# Patient Record
Sex: MALE | Race: OTHER | HISPANIC OR LATINO
[De-identification: names, ages, dates, MRNs, and addresses within clinical notes are randomized per-mention and may not be internally consistent; named-entity substitution may affect disease eponyms.]

---

## 2017-08-03 ENCOUNTER — TRANSCRIPTION ENCOUNTER (OUTPATIENT)
Age: 22
End: 2017-08-03

## 2017-08-10 ENCOUNTER — TRANSCRIPTION ENCOUNTER (OUTPATIENT)
Age: 22
End: 2017-08-10

## 2017-10-03 ENCOUNTER — TRANSCRIPTION ENCOUNTER (OUTPATIENT)
Age: 22
End: 2017-10-03

## 2018-06-06 ENCOUNTER — TRANSCRIPTION ENCOUNTER (OUTPATIENT)
Age: 23
End: 2018-06-06

## 2020-12-28 ENCOUNTER — EMERGENCY (EMERGENCY)
Facility: HOSPITAL | Age: 25
LOS: 0 days | Discharge: HOME | End: 2020-12-28
Attending: EMERGENCY MEDICINE | Admitting: EMERGENCY MEDICINE
Payer: MEDICAID

## 2020-12-28 VITALS
HEART RATE: 96 BPM | SYSTOLIC BLOOD PRESSURE: 152 MMHG | RESPIRATION RATE: 18 BRPM | WEIGHT: 279.99 LBS | HEIGHT: 64 IN | OXYGEN SATURATION: 98 % | DIASTOLIC BLOOD PRESSURE: 94 MMHG | TEMPERATURE: 98 F

## 2020-12-28 DIAGNOSIS — R07.89 OTHER CHEST PAIN: ICD-10-CM

## 2020-12-28 DIAGNOSIS — J45.909 UNSPECIFIED ASTHMA, UNCOMPLICATED: ICD-10-CM

## 2020-12-28 DIAGNOSIS — M94.0 CHONDROCOSTAL JUNCTION SYNDROME [TIETZE]: ICD-10-CM

## 2020-12-28 DIAGNOSIS — Z20.828 CONTACT WITH AND (SUSPECTED) EXPOSURE TO OTHER VIRAL COMMUNICABLE DISEASES: ICD-10-CM

## 2020-12-28 PROCEDURE — 71046 X-RAY EXAM CHEST 2 VIEWS: CPT | Mod: 26

## 2020-12-28 PROCEDURE — 99284 EMERGENCY DEPT VISIT MOD MDM: CPT

## 2020-12-28 NOTE — ED ADULT TRIAGE NOTE - CHIEF COMPLAINT QUOTE
pt states "I been having chest tightness and pain x2weeks, that moves. I also have been going through a lot and feel anxious"

## 2020-12-28 NOTE — ED PROVIDER NOTE - OBJECTIVE STATEMENT
25y male with PMH of asthma well controlled not on inhaler presents with 1 week hx of gradual onset chest tightness. 25y male with PMH of asthma well controlled not on inhaler presents with 1 week hx of gradual onset chest tightness. reports using albuterol inhaler with no resolution of symptoms.  Denies HA, Fever, Chills, Weakness, SOB, AC, cough, loss of taste or smell, abd pain, leg swelling.

## 2020-12-28 NOTE — ED PROVIDER NOTE - NS ED ROS FT
Constitutional:  No fevers or chills.  Eyes:  No visual changes, eye pain, or discharge.  ENT:  No hearing changes. No sore throat.  Neck:  No neck pain or stiffness.  Cardiac:  (+) chest tightness. No edema.  Resp:  No cough or SOB. No hemoptysis.   GI:  No nausea, vomiting, diarrhea, or abdominal pain.  :  No dysuria, frequency, or hematuria.  MSK:  No myalgias or joint pain/swelling.  Neuro:  No headache, dizziness, or weakness.  Skin:  No skin rash.

## 2020-12-28 NOTE — ED PROVIDER NOTE - PATIENT PORTAL LINK FT
You can access the FollowMyHealth Patient Portal offered by [facility] by registering at the following website: [email or web address]. By joining [employer]’s FollowMyHealth portal, you will also be able to view your health information using other applications (apps) compatible with our system.

## 2020-12-28 NOTE — ED PROVIDER NOTE - ATTENDING CONTRIBUTION TO CARE
c/o chest pain.  hx of asthma.  VS noted.  Chest clear.  Heart RR no murmur.  abd NT.  Ext FROM.  =pulses.  ekg, CXR normal.
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
## 2020-12-28 NOTE — ED PROVIDER NOTE - CLINICAL SUMMARY MEDICAL DECISION MAKING FREE TEXT BOX
low risk for ACS, PE.  dx testing reviewed. In my opinion, out patient treatment and follow up are appropriate.

## 2020-12-28 NOTE — ED PROVIDER NOTE - NSFOLLOWUPCLINICS_GEN_ALL_ED_FT
[facility]  Medicine  [address], [place]   Phone: [phone]  Fax:   Follow Up Time: Routine

## 2020-12-29 LAB — SARS-COV-2 RNA SPEC QL NAA+PROBE: SIGNIFICANT CHANGE UP

## 2023-01-16 NOTE — ED PROVIDER NOTE - NSFOLLOWUPINSTRUCTIONS_ED_ALL_ED_FT
Glasses Prescription given to patient. Costochondritis  Costochondritis is swelling and irritation (inflammation) of the tissue (cartilage) that connects your ribs to your breastbone (sternum). This causes pain in the front of your chest. Usually, the pain:  Starts gradually.  Is in more than one rib.  This condition usually goes away on its own over time.    Follow these instructions at home:  Do not do anything that makes your pain worse.  If directed, put ice on the painful area:  Put ice in a plastic bag.  Place a towel between your skin and the bag.  Leave the ice on for 20 minutes, 2–3 times a day.  If directed, put heat on the affected area as often as told by your doctor. Use the heat source that your doctor tells you to use, such as a moist heat pack or a heating pad.  Place a towel between your skin and the heat source.  Leave the heat on for 20–30 minutes.  Take off the heat if your skin turns bright red. This is very important if you cannot feel pain, heat, or cold. You may have a greater risk of getting burned.  Take over-the-counter and prescription medicines only as told by your doctor.  Return to your normal activities as told by your doctor. Ask your doctor what activities are safe for you.  Keep all follow-up visits as told by your doctor. This is important.  Contact a doctor if:  You have chills or a fever.  Your pain does not go away or it gets worse.  You have a cough that does not go away.  Get help right away if:  You are short of breath.  This information is not intended to replace advice given to you by your health care provider. Make sure you discuss any questions you have with your health care provider.

## 2024-04-18 ENCOUNTER — INPATIENT (INPATIENT)
Facility: HOSPITAL | Age: 29
LOS: 0 days | Discharge: HOME | DRG: 53 | End: 2024-04-19
Attending: HOSPITALIST | Admitting: STUDENT IN AN ORGANIZED HEALTH CARE EDUCATION/TRAINING PROGRAM
Payer: MEDICAID

## 2024-04-18 VITALS
OXYGEN SATURATION: 98 % | HEART RATE: 90 BPM | DIASTOLIC BLOOD PRESSURE: 81 MMHG | TEMPERATURE: 98 F | RESPIRATION RATE: 18 BRPM | SYSTOLIC BLOOD PRESSURE: 173 MMHG | HEIGHT: 64 IN | WEIGHT: 315 LBS

## 2024-04-18 DIAGNOSIS — R56.9 UNSPECIFIED CONVULSIONS: ICD-10-CM

## 2024-04-18 PROBLEM — J45.909 UNSPECIFIED ASTHMA, UNCOMPLICATED: Chronic | Status: ACTIVE | Noted: 2020-12-28

## 2024-04-18 LAB
ALBUMIN SERPL ELPH-MCNC: 4.4 G/DL — SIGNIFICANT CHANGE UP (ref 3.5–5.2)
ALP SERPL-CCNC: 101 U/L — SIGNIFICANT CHANGE UP (ref 30–115)
ALT FLD-CCNC: 29 U/L — SIGNIFICANT CHANGE UP (ref 0–41)
ANION GAP SERPL CALC-SCNC: 11 MMOL/L — SIGNIFICANT CHANGE UP (ref 7–14)
AST SERPL-CCNC: 19 U/L — SIGNIFICANT CHANGE UP (ref 0–41)
BASOPHILS # BLD AUTO: 0.04 K/UL — SIGNIFICANT CHANGE UP (ref 0–0.2)
BASOPHILS NFR BLD AUTO: 0.2 % — SIGNIFICANT CHANGE UP (ref 0–1)
BILIRUB SERPL-MCNC: 0.4 MG/DL — SIGNIFICANT CHANGE UP (ref 0.2–1.2)
BUN SERPL-MCNC: 13 MG/DL — SIGNIFICANT CHANGE UP (ref 10–20)
CALCIUM SERPL-MCNC: 9.5 MG/DL — SIGNIFICANT CHANGE UP (ref 8.4–10.5)
CHLORIDE SERPL-SCNC: 100 MMOL/L — SIGNIFICANT CHANGE UP (ref 98–110)
CK SERPL-CCNC: 184 U/L — SIGNIFICANT CHANGE UP (ref 0–225)
CO2 SERPL-SCNC: 26 MMOL/L — SIGNIFICANT CHANGE UP (ref 17–32)
CREAT SERPL-MCNC: 0.9 MG/DL — SIGNIFICANT CHANGE UP (ref 0.7–1.5)
EGFR: 119 ML/MIN/1.73M2 — SIGNIFICANT CHANGE UP
EOSINOPHIL # BLD AUTO: 0.04 K/UL — SIGNIFICANT CHANGE UP (ref 0–0.7)
EOSINOPHIL NFR BLD AUTO: 0.2 % — SIGNIFICANT CHANGE UP (ref 0–8)
GLUCOSE SERPL-MCNC: 99 MG/DL — SIGNIFICANT CHANGE UP (ref 70–99)
HCT VFR BLD CALC: 45.6 % — SIGNIFICANT CHANGE UP (ref 42–52)
HGB BLD-MCNC: 15.2 G/DL — SIGNIFICANT CHANGE UP (ref 14–18)
IMM GRANULOCYTES NFR BLD AUTO: 0.5 % — HIGH (ref 0.1–0.3)
LACTATE SERPL-SCNC: 1.3 MMOL/L — SIGNIFICANT CHANGE UP (ref 0.7–2)
LACTATE SERPL-SCNC: 1.6 MMOL/L — SIGNIFICANT CHANGE UP (ref 0.7–2)
LYMPHOCYTES # BLD AUTO: 1.29 K/UL — SIGNIFICANT CHANGE UP (ref 1.2–3.4)
LYMPHOCYTES # BLD AUTO: 7.2 % — LOW (ref 20.5–51.1)
MCHC RBC-ENTMCNC: 28.7 PG — SIGNIFICANT CHANGE UP (ref 27–31)
MCHC RBC-ENTMCNC: 33.3 G/DL — SIGNIFICANT CHANGE UP (ref 32–37)
MCV RBC AUTO: 86 FL — SIGNIFICANT CHANGE UP (ref 80–94)
MONOCYTES # BLD AUTO: 0.61 K/UL — HIGH (ref 0.1–0.6)
MONOCYTES NFR BLD AUTO: 3.4 % — SIGNIFICANT CHANGE UP (ref 1.7–9.3)
NEUTROPHILS # BLD AUTO: 15.89 K/UL — HIGH (ref 1.4–6.5)
NEUTROPHILS NFR BLD AUTO: 88.5 % — HIGH (ref 42.2–75.2)
NRBC # BLD: 0 /100 WBCS — SIGNIFICANT CHANGE UP (ref 0–0)
PLATELET # BLD AUTO: 354 K/UL — SIGNIFICANT CHANGE UP (ref 130–400)
PMV BLD: 9.8 FL — SIGNIFICANT CHANGE UP (ref 7.4–10.4)
POTASSIUM SERPL-MCNC: 4.6 MMOL/L — SIGNIFICANT CHANGE UP (ref 3.5–5)
POTASSIUM SERPL-SCNC: 4.6 MMOL/L — SIGNIFICANT CHANGE UP (ref 3.5–5)
PROT SERPL-MCNC: 7.2 G/DL — SIGNIFICANT CHANGE UP (ref 6–8)
RBC # BLD: 5.3 M/UL — SIGNIFICANT CHANGE UP (ref 4.7–6.1)
RBC # FLD: 13 % — SIGNIFICANT CHANGE UP (ref 11.5–14.5)
SODIUM SERPL-SCNC: 137 MMOL/L — SIGNIFICANT CHANGE UP (ref 135–146)
WBC # BLD: 17.96 K/UL — HIGH (ref 4.8–10.8)
WBC # FLD AUTO: 17.96 K/UL — HIGH (ref 4.8–10.8)

## 2024-04-18 PROCEDURE — 85025 COMPLETE CBC W/AUTO DIFF WBC: CPT

## 2024-04-18 PROCEDURE — 70450 CT HEAD/BRAIN W/O DYE: CPT | Mod: 26,MC

## 2024-04-18 PROCEDURE — 99285 EMERGENCY DEPT VISIT HI MDM: CPT

## 2024-04-18 PROCEDURE — 83605 ASSAY OF LACTIC ACID: CPT

## 2024-04-18 PROCEDURE — 80053 COMPREHEN METABOLIC PANEL: CPT

## 2024-04-18 PROCEDURE — 36415 COLL VENOUS BLD VENIPUNCTURE: CPT

## 2024-04-18 PROCEDURE — 95716 VEEG EA 12-26HR CONT MNTR: CPT

## 2024-04-18 PROCEDURE — 95700 EEG CONT REC W/VID EEG TECH: CPT

## 2024-04-18 PROCEDURE — 82550 ASSAY OF CK (CPK): CPT

## 2024-04-18 PROCEDURE — 99222 1ST HOSP IP/OBS MODERATE 55: CPT

## 2024-04-18 RX ORDER — ONDANSETRON 8 MG/1
4 TABLET, FILM COATED ORAL EVERY 8 HOURS
Refills: 0 | Status: DISCONTINUED | OUTPATIENT
Start: 2024-04-18 | End: 2024-04-19

## 2024-04-18 RX ORDER — ACETAMINOPHEN 500 MG
650 TABLET ORAL EVERY 6 HOURS
Refills: 0 | Status: DISCONTINUED | OUTPATIENT
Start: 2024-04-18 | End: 2024-04-19

## 2024-04-18 RX ORDER — LANOLIN ALCOHOL/MO/W.PET/CERES
3 CREAM (GRAM) TOPICAL AT BEDTIME
Refills: 0 | Status: DISCONTINUED | OUTPATIENT
Start: 2024-04-18 | End: 2024-04-19

## 2024-04-18 RX ORDER — INFLUENZA VIRUS VACCINE 15; 15; 15; 15 UG/.5ML; UG/.5ML; UG/.5ML; UG/.5ML
0.5 SUSPENSION INTRAMUSCULAR ONCE
Refills: 0 | Status: DISCONTINUED | OUTPATIENT
Start: 2024-04-18 | End: 2024-04-19

## 2024-04-18 NOTE — CONSULT NOTE ADULT - ASSESSMENT
29-year-old M w PMH of remote asthma, PSH significant for tonsillectomy, ?KERRI, cigarette/marijuana smoker p/w seizure-like episode this morning, while waiting for the bus, and per description and seemed reliable collaterals (wife) - GTCS and mostly unprovoked and needs to EMU admission for seizure characterization and possible treatment initiation.     Recommendations:     - EMU admission to [facility]   - Please obtain Utox stat before transferring the patient   - f/u routine labs, CTH (negative for acute changes)       Discussed w attending Dr. Avilez

## 2024-04-18 NOTE — ED PROVIDER NOTE - CLINICAL SUMMARY MEDICAL DECISION MAKING FREE TEXT BOX
This patient presents with symptoms consistent with new onset acute seizure. I considered, but think less likely, secondary etiologies of epileptic seizures to include drug / toxin etiologies (ETOH, stimulants, medication side effects), metabolic disturbances (glucose, Na), acute CNS infections (meningitis, encephalitis, abscess), ICH / tumor / CVA. Presentation not consistent with impact seizure related to head trauma. The post-ictal state resolved prior to discharge and the patient had returned to neurological baseline. D/w neurology, will be transferred for further eval and management to neuro unit.

## 2024-04-18 NOTE — ED PROVIDER NOTE - PROGRESS NOTE DETAILS
Spoke with [facility] and MAR.  Patient will be admitted to [facility] epilepsy monitoring unit under [facility].

## 2024-04-18 NOTE — CONSULT NOTE ADULT - SUBJECTIVE AND OBJECTIVE BOX
NEUROLOGY CONSULT    HPI: 29-year-old M history of asthma, PSH significant for tonsillectomy, ?KERRI, cigarette smoker p/w p/w seizure-like episode this morning, while waiting for the bus, hit his head, associated urinary incontinence, lip biting, blacked out with anterograde amnesia, woke up in the ED with soreness all over his body. Pt is f/u w [facility] neurologist and per patient all w/u including MRI and labs was negative and he was waiting for EEG test and f/u w neurologist in July.       [] Reason for the consult: Seizures   [] Seizure type: GTCS per discription  [] Onset: First sz - 3 mo ago   [] Aggravating factors: Stress, Lack of sleep, on one occasion excessive psylocybine mushrooms shake ingestion.   [] Total occurrences: 3      [] Last seizure: 4/18/2024    [] Description from patient: Cannot remember the onset,  no obvious aura, all body is sore afterwards    [] Collateral description of event: Fall from the bed, got rigid, eyes closed, circumferential grasping and tearing violently shaking clonic movement all over, drooling with difficulty breathing.     [] Aura: No   [] Duration: 2-5 min.    [] Tongue biting: Lip biting   [] Urine/bowel incontinence: Yes - in all occasions    [] Frequency: every month so far    [] Postictal state: About 20 min. confusion and sleepess     [] ASM:  None   [] Previous ASM: None    [] Previous EEGs: Not done  [] Last EEG/VEEG: Not done    [] Last MRI brain: per pt in [facility] ~ 3 mo ago  [] FHx: (Seizures/psychiatric conditions/movement disorders): Denies  [] Family hx of epilepsy?:  Denies   [] Known CNS pathology?: No  [] Head injury with subsequent LOC?: Maybe, but not sure    [] Febrile seizures in infancy?: No  [] Hx of CNS infection?:  No  [] Comorbid conditions: Overweight, KERRI?   [] SHx: (ETOH/tobacco/drugs): Everyday marijuana smoker, psylocybine use (occationally), social Etoh   [] Pertinent labs if available: WBC elevated - most likely due to Seizure event.        Home Medications: none  No Known Allergies  Intolerances    NEUROLOGICAL EXAMINATION:  GENERAL:  Appearance is consistent with chronologic age. NAD, overweight pleasant, cooperative   COGNITION/LANGUAGE:  Awake, alert, and oriented to person, place, time and date.  Fluent, intact comprehension, repetition, naming. Recent and remote memory intact.  Fund of knowledge is appropriate.  Nondysarthric.    CRANIAL NERVES:   - Eyes:  Visual acuity intact. Pupils equal round and reactive, no RAPD. EOMI w/o nystagmus, skew or reported double vision. Normal visual field on confrontation. No ptosis/weakness of eyelid closure.   - Face:  Facial sensation normal V1 - 3, no facial asymmetry.    - Ears/Nose/Throat:  Hearing grossly intact b/l to finger rub.  Palate elevates midline.  Tongue and uvula midline.   MOTOR EXAM:  (R/L) 5/5 UE; 5/5 LE.  No observable drift. Normal tone and bulk. No tenderness, twitching, tremors or involuntary movements.  SENSORY EXAM:  Intact to light touch and pinprick in all extremities.  REFLEXES:   2+ b/l biceps, triceps, patella and achilles.  Plantar response downgoing b/l.  Jaw jerk, Bernabe, clonus absent.  CEREBELLUM:  Finger to nose/Heel to knee and shin intact.  No dysmetria.    GAIT: narrow based and normal.  Romberg: negative.       LABS:                        15.2   17.96 )-----------( 354      ( 18 Apr 2024 12:08 )             45.6     04-18    137  |  100  |  13  ----------------------------<  99  4.6   |  26  |  0.9    Ca    9.5      18 Apr 2024 12:08    TPro  7.2  /  Alb  4.4  /  TBili  0.4  /  DBili  x   /  AST  19  /  ALT  29  /  AlkPhos  101  04-18    Hemoglobin A1C:   Vitamin B12     CAPILLARY BLOOD GLUCOSE  Urinalysis Basic - ( 18 Apr 2024 12:08 )  Color: x / Appearance: x / SG: x / pH: x  Gluc: 99 mg/dL / Ketone: x  / Bili: x / Urobili: x   Blood: x / Protein: x / Nitrite: x   Leuk Esterase: x / RBC: x / WBC x   Sq Epi: x / Non Sq Epi: x / Bacteria: x        Microbiology:  RADIOLOGY, EKG AND ADDITIONAL TESTS: Reviewed.

## 2024-04-18 NOTE — ED PROVIDER NOTE - ATTENDING CONTRIBUTION TO CARE
I have reviewed and agree with the mid-level note, except as documented in my note below.    29-year-old male history of asthma, PSH significant for tonsillectomy, cigarette smoker, denies daily EtOH use or illegal substance use, states has previously had witnessed GTC seizure (his wife witnessed these episodes), never previously evaluated, today had GTC while waiting for the bus, hit his head, associated urinary incontinence, without complaints at present, denies fever, visual disturbances, neck pain, headache, palpitations, irregular heart-beat, chest pain / pressure, abd pain, n/v/d, urinary sx, dyspnea, LE edema, abnormal speech, paresthesias, clumsiness, difficulty with coordination, focal weakness or neurological deficits, or other associated complaints at present. Old chart reviewed. I have reviewed and agree with the initial nursing note, except as documented in my note.    VSS, awake, alert, non-toxic appearing, PERRL / EOMI, oropharynx clear, mmm, no skin rash or lesions, chest CTAB, non-labored breathing, no w/r/r, +S1/S2, RRR, no m/r/g, abdomen soft, NT, ND, +BS, no peripheral edema or deformities, equal pulses upper and lower extremities, alert and oriented to person, place and time, clear speech, cranial nerves II-XII are intact, upper and lower extremity strength is 5/5, symmetrical against gravity and external force, cerebellar testing of finger to nose is intact, coordination and gait are normal.

## 2024-04-18 NOTE — ED ADULT TRIAGE NOTE - CHIEF COMPLAINT QUOTE
newly dx with seizures, not on medications, had seiziure this am at 0930, small abrasion to back of head with hematoma

## 2024-04-18 NOTE — H&P ADULT - ASSESSMENT
# epilepsy  - neurology consult  - epilepsy unit [facility]    # asthma - not in exacerbation     29 year old male with no significnat pmhx presents with 3x episodes of seizures in last 3 months, most recently today. presents to ED for neurology and epilepsy eval.    # suspected seizures  - epilepsy unit [facility] site  - aspiration precautions, fall precautions, seizure precautions  - ativan for generalized tonic clonic seizure  - pending neuro recs     # R parietal injury secondary to fall  # asthma - not in exacerbation

## 2024-04-18 NOTE — ED PROVIDER NOTE - PHYSICAL EXAMINATION
CONSTITUTIONAL:  in no acute distress.   SKIN: warm, dry  HEAD: Normocephalic; atraumatic.  EYES: PERRL, EOMI, normal sclera and conjunctiva   ENT: No nasal discharge; airway clear.  NECK: Supple; non tender.  CARD:  Regular rate and rhythm.   RESP: NO inc WOB   ABD: soft ntnd  EXT: Normal ROM.    LYMPH: No acute cervical adenopathy.  NEURO: AAO x 3, normal speech, no facial asymmetry, negative pronator drift, no ataxia, negative Romberg, no dysdiadokinesia, no nystagmus, peripheral vision intact, sensory equal and intact.  PSYCH: Cooperative, appropriate.
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
## 2024-04-18 NOTE — H&P ADULT - NSHPPHYSICALEXAM_GEN_ALL_CORE
general, neuro: no acute distress, resting comfortably; follows commands intact cognition  head: red raised lesion on occipital portion of cranium, tender  chest: s1 s2, ?tachy, ctabl, no wheeze  abd: soft nontender  extremities: no edema or signs of injuries  msk: intact strength

## 2024-04-18 NOTE — H&P ADULT - HISTORY OF PRESENT ILLNESS
29 year old man with asthma and recent episodes of seizures for last 3 months 3x, last today. He needs and EEG and eventual initiation of antiepileptics by neurology. He is being admitted to [facility] epilepsy unit. pending further neurology eval.     Extended Triage:    Vital Signs:  · BP Systolic	 173 mm Hg  · BP Diastolic	81 mm Hg  · Heart Rate	90 /min  · Respiration Rate (breaths/min)	18 /min  · Temp (F)	98.3 Degrees F  · Temp (C)	36.8 Degrees C  · Temp site	oral  · SpO2 (%)	98 %  · O2 Delivery/Oxygen Delivery Method	room air    Labs: leukocytosis  Imaging: CTH R parietal scalp soft tissue swelling  Intervention: pending transfer to epilepsy unit 29 year old man with asthma. Presents after seizure today outside after dropping son off at school. He has had 3 episodes total, first 3 months ago. On the second occasion he had tongue biting and loss of bladder control. This is the first time it has happened outside the home. He has no recollection of the events immediately prior or after. But does state he felt an uneasy feeling just prior to event. During the episode today, he woke up on the floor groggy with trauma to the back of his head. He decided to come to the hospital because he still felt groggy after an extended period of time. He has not been able to see a neurologist yet in the outpatient setting. Denies family hx of seizures or specific HT or traumatic accidents. No nausea, vomiting, URI or UTI symptoms. He used to play football in high-school He smokes marijuana and nicotine vapes and occasional alcohol use.     Extended Triage:    Vital Signs:  · BP Systolic	 173 mm Hg  · BP Diastolic	81 mm Hg  · Heart Rate	90 /min  · Respiration Rate (breaths/min)	18 /min  · Temp (F)	98.3 Degrees F  · Temp (C)	36.8 Degrees C  · Temp site	oral  · SpO2 (%)	98 %  · O2 Delivery/Oxygen Delivery Method	room air    Labs: leukocytosis  Imaging: CTH R parietal scalp soft tissue swelling  Intervention: pending transfer to epilepsy unit

## 2024-04-18 NOTE — ED PROVIDER NOTE - OBJECTIVE STATEMENT
29-year-old past medical history of asthma coming in here for witnessed seizure.  Patient had 3 seizures in the past 3 months.  New/recent diagnosis of epilepsy.  Has been followed with neurology but has not had an EEG or started on any antiepileptics.  Coming in here for further eval.

## 2024-04-19 ENCOUNTER — TRANSCRIPTION ENCOUNTER (OUTPATIENT)
Age: 29
End: 2024-04-19

## 2024-04-19 VITALS
DIASTOLIC BLOOD PRESSURE: 68 MMHG | OXYGEN SATURATION: 98 % | HEART RATE: 106 BPM | TEMPERATURE: 97 F | RESPIRATION RATE: 18 BRPM | SYSTOLIC BLOOD PRESSURE: 122 MMHG

## 2024-04-19 DIAGNOSIS — R56.9 UNSPECIFIED CONVULSIONS: ICD-10-CM

## 2024-04-19 PROBLEM — Z00.00 ENCOUNTER FOR PREVENTIVE HEALTH EXAMINATION: Status: ACTIVE | Noted: 2024-04-19

## 2024-04-19 LAB
ALBUMIN SERPL ELPH-MCNC: 3.9 G/DL — SIGNIFICANT CHANGE UP (ref 3.5–5.2)
ALP SERPL-CCNC: 97 U/L — SIGNIFICANT CHANGE UP (ref 30–115)
ALT FLD-CCNC: 23 U/L — SIGNIFICANT CHANGE UP (ref 0–41)
AMPHET UR-MCNC: NEGATIVE — SIGNIFICANT CHANGE UP
ANION GAP SERPL CALC-SCNC: 12 MMOL/L — SIGNIFICANT CHANGE UP (ref 7–14)
AST SERPL-CCNC: 15 U/L — SIGNIFICANT CHANGE UP (ref 0–41)
BARBITURATES UR SCN-MCNC: NEGATIVE — SIGNIFICANT CHANGE UP
BASOPHILS # BLD AUTO: 0.03 K/UL — SIGNIFICANT CHANGE UP (ref 0–0.2)
BASOPHILS NFR BLD AUTO: 0.3 % — SIGNIFICANT CHANGE UP (ref 0–1)
BENZODIAZ UR-MCNC: NEGATIVE — SIGNIFICANT CHANGE UP
BILIRUB SERPL-MCNC: 0.3 MG/DL — SIGNIFICANT CHANGE UP (ref 0.2–1.2)
BUN SERPL-MCNC: 15 MG/DL — SIGNIFICANT CHANGE UP (ref 10–20)
CALCIUM SERPL-MCNC: 8.9 MG/DL — SIGNIFICANT CHANGE UP (ref 8.4–10.5)
CHLORIDE SERPL-SCNC: 104 MMOL/L — SIGNIFICANT CHANGE UP (ref 98–110)
CO2 SERPL-SCNC: 24 MMOL/L — SIGNIFICANT CHANGE UP (ref 17–32)
COCAINE METAB.OTHER UR-MCNC: NEGATIVE — SIGNIFICANT CHANGE UP
CREAT SERPL-MCNC: 1 MG/DL — SIGNIFICANT CHANGE UP (ref 0.7–1.5)
DRUG SCREEN 1, URINE RESULT: SIGNIFICANT CHANGE UP
EGFR: 105 ML/MIN/1.73M2 — SIGNIFICANT CHANGE UP
EOSINOPHIL # BLD AUTO: 0.14 K/UL — SIGNIFICANT CHANGE UP (ref 0–0.7)
EOSINOPHIL NFR BLD AUTO: 1.3 % — SIGNIFICANT CHANGE UP (ref 0–8)
FENTANYL UR QL: NEGATIVE — SIGNIFICANT CHANGE UP
GLUCOSE SERPL-MCNC: 99 MG/DL — SIGNIFICANT CHANGE UP (ref 70–99)
HCT VFR BLD CALC: 42.3 % — SIGNIFICANT CHANGE UP (ref 42–52)
HGB BLD-MCNC: 13.9 G/DL — LOW (ref 14–18)
IMM GRANULOCYTES NFR BLD AUTO: 0.3 % — SIGNIFICANT CHANGE UP (ref 0.1–0.3)
LYMPHOCYTES # BLD AUTO: 19.9 % — LOW (ref 20.5–51.1)
LYMPHOCYTES # BLD AUTO: 2.13 K/UL — SIGNIFICANT CHANGE UP (ref 1.2–3.4)
MCHC RBC-ENTMCNC: 28.3 PG — SIGNIFICANT CHANGE UP (ref 27–31)
MCHC RBC-ENTMCNC: 32.9 G/DL — SIGNIFICANT CHANGE UP (ref 32–37)
MCV RBC AUTO: 86 FL — SIGNIFICANT CHANGE UP (ref 80–94)
METHADONE UR-MCNC: NEGATIVE — SIGNIFICANT CHANGE UP
MONOCYTES # BLD AUTO: 0.63 K/UL — HIGH (ref 0.1–0.6)
MONOCYTES NFR BLD AUTO: 5.9 % — SIGNIFICANT CHANGE UP (ref 1.7–9.3)
NEUTROPHILS # BLD AUTO: 7.75 K/UL — HIGH (ref 1.4–6.5)
NEUTROPHILS NFR BLD AUTO: 72.3 % — SIGNIFICANT CHANGE UP (ref 42.2–75.2)
NRBC # BLD: 0 /100 WBCS — SIGNIFICANT CHANGE UP (ref 0–0)
OPIATES UR-MCNC: NEGATIVE — SIGNIFICANT CHANGE UP
OXYCODONE UR-MCNC: NEGATIVE — SIGNIFICANT CHANGE UP
PCP UR-MCNC: NEGATIVE — SIGNIFICANT CHANGE UP
PLATELET # BLD AUTO: 316 K/UL — SIGNIFICANT CHANGE UP (ref 130–400)
PMV BLD: 9.9 FL — SIGNIFICANT CHANGE UP (ref 7.4–10.4)
POTASSIUM SERPL-MCNC: 4.4 MMOL/L — SIGNIFICANT CHANGE UP (ref 3.5–5)
POTASSIUM SERPL-SCNC: 4.4 MMOL/L — SIGNIFICANT CHANGE UP (ref 3.5–5)
PROPOXYPHENE QUALITATIVE URINE RESULT: NEGATIVE — SIGNIFICANT CHANGE UP
PROT SERPL-MCNC: 6.2 G/DL — SIGNIFICANT CHANGE UP (ref 6–8)
RBC # BLD: 4.92 M/UL — SIGNIFICANT CHANGE UP (ref 4.7–6.1)
RBC # FLD: 13 % — SIGNIFICANT CHANGE UP (ref 11.5–14.5)
SODIUM SERPL-SCNC: 140 MMOL/L — SIGNIFICANT CHANGE UP (ref 135–146)
THC UR QL: POSITIVE
WBC # BLD: 10.71 K/UL — SIGNIFICANT CHANGE UP (ref 4.8–10.8)
WBC # FLD AUTO: 10.71 K/UL — SIGNIFICANT CHANGE UP (ref 4.8–10.8)

## 2024-04-19 PROCEDURE — 95720 EEG PHY/QHP EA INCR W/VEEG: CPT

## 2024-04-19 PROCEDURE — 99239 HOSP IP/OBS DSCHRG MGMT >30: CPT

## 2024-04-19 PROCEDURE — 95718 EEG PHYS/QHP 2-12 HR W/VEEG: CPT

## 2024-04-19 PROCEDURE — 99231 SBSQ HOSP IP/OBS SF/LOW 25: CPT

## 2024-04-19 RX ORDER — PYRIDOXINE HCL (VITAMIN B6) 100 MG
1 TABLET ORAL
Qty: 30 | Refills: 2
Start: 2024-04-19 | End: 2024-07-17

## 2024-04-19 RX ORDER — LEVETIRACETAM 250 MG/1
250 TABLET, FILM COATED ORAL ONCE
Refills: 0 | Status: COMPLETED | OUTPATIENT
Start: 2024-04-19 | End: 2024-04-19

## 2024-04-19 RX ORDER — LEVETIRACETAM 250 MG/1
1 TABLET, FILM COATED ORAL
Qty: 60 | Refills: 2
Start: 2024-04-19 | End: 2024-07-17

## 2024-04-19 RX ORDER — LEVETIRACETAM 250 MG/1
500 TABLET, FILM COATED ORAL
Refills: 0 | Status: DISCONTINUED | OUTPATIENT
Start: 2024-04-19 | End: 2024-04-19

## 2024-04-19 RX ADMIN — LEVETIRACETAM 250 MILLIGRAM(S): 250 TABLET, FILM COATED ORAL at 13:44

## 2024-04-19 RX ADMIN — LEVETIRACETAM 250 MILLIGRAM(S): 250 TABLET, FILM COATED ORAL at 12:12

## 2024-04-19 NOTE — DISCHARGE NOTE PROVIDER - NSDCFUSCHEDAPPT_GEN_ALL_CORE_FT
Steve Magaña  [facility]  NEUROLOGY [address] [place] [address]  Scheduled Appointment: 05/15/2024

## 2024-04-19 NOTE — PROGRESS NOTE ADULT - SUBJECTIVE AND OBJECTIVE BOX
Epilepsy Team Discharge Note:    VEEG monitoring completed, patient is cleared for discharge from neurology standpoint.    Follow up neurology appointment is scheduled with Dr. Magaña   for May 15, 2024 at 1 pm.    [address] [place] [address], [address]  [phone]    Discharge seizure medications are:  Keppra 500mg q12hrs (started today)    Rx sent to the pharmacy.    Start vitamin B6 100mg daily, Rx sent    MRI brain with seizure protocol prior to follow up. Patient will be contacted once insurance authorization received.    Patient was also given Rx for CBC, CMP, Mg, Keppra level trough to be done prior to follow up.    Referral given to pulmonology for sleep study.    Patient was advised in regards to risks and driving privileges associated with the [place] Guidelines.  The patient was advised in regards to the risk of seizures and general seizure safety recommendations including not to be bathing alone, climbing to high places and operating heavy machinery. The importance of compliance with medications was reinforced.  Sleep hygiene and the risks of sleep disruption were discussed. SUDEP was discussed.    Detailed written and verbal instructions regarding seizure precautions and follow up plan are given to the patient, all questions answered. Patient verbalized understanding.    Discussed with medical and nursing teams.   Epilepsy Team Discharge Note:    VEEG monitoring completed, patient is cleared for discharge from neurology standpoint.    Follow up neurology appointment is scheduled with Dr. Magaña   for May 15, 2024 at 1 pm.    [address] [place] [address], [address]  [phone]    Discharge seizure medications are:  Keppra 500mg q12hrs (started today)    Rx sent to the pharmacy.    Start vitamin B6 100mg daily, Rx sent    MRI brain with seizure protocol prior to follow up. Patient will be contacted once insurance authorization received.    Patient was also given Rx for CBC, CMP, Mg, Keppra level trough to be done prior to follow up.    Referral given to pulmonology for sleep study.    Patient was advised in regards to risks and driving privileges associated with the [place] Guidelines.  The patient was advised in regards to the risk of seizures and general seizure safety recommendations including not to be bathing alone, climbing to high places and operating heavy machinery. The importance of compliance with medications was reinforced.  Sleep hygiene and the risks of sleep disruption were discussed.     Detailed written and verbal instructions regarding seizure precautions and follow up plan are given to the patient, all questions answered. Patient verbalized understanding.    Discussed with medical and nursing teams.

## 2024-04-19 NOTE — DISCHARGE NOTE NURSING/CASE MANAGEMENT/SOCIAL WORK - NSDCFUADDAPPT_GEN_ALL_CORE_FT
Follow up neurology appointment is scheduled with Dr. Magaña   for May 15, 2024 at 1 pm.    [address] [place] [address], [address]  [phone]

## 2024-04-19 NOTE — PROGRESS NOTE ADULT - SUBJECTIVE AND OBJECTIVE BOX
Epilepsy Attending Note:     BERNICE LIAO    29y Male  MRN [id number]    Vital Signs Last 24 Hrs  T(C): 36.2 (2024 04:44), Max: 36.8 (2024 11:29)  T(F): 97.1 (2024 04:44), Max: 98.3 (2024 11:29)  HR: 106 (2024 04:44) (90 - 106)  BP: 122/68 (2024 04:44) (112/59 - 173/81)  BP(mean): --  RR: 18 (2024 04:44) (18 - 18)  SpO2: 98% (2024 04:44) (97% - 100%)    Parameters below as of 2024 04:44  Patient On (Oxygen Delivery Method): room air                              13.9   10.71 )-----------( 316      ( 2024 06:27 )             42.3       04-19    140  |  104  |  15  ----------------------------<  99  4.4   |  24  |  1.0    Ca    8.9      2024 06:27    TPro  6.2  /  Alb  3.9  /  TBili  0.3  /  DBili  x   /  AST  15  /  ALT  23  /  AlkPhos  97  -      MEDICATIONS  (STANDING):  influenza   Vaccine 0.5 milliLiter(s) IntraMuscular once    MEDICATIONS  (PRN):  acetaminophen     Tablet .. 650 milliGRAM(s) Oral every 6 hours PRN Temp greater or equal to 38C (100.4F), Mild Pain (1 - 3)  aluminum hydroxide/magnesium hydroxide/simethicone Suspension 30 milliLiter(s) Oral every 4 hours PRN Dyspepsia  LORazepam   Injectable 2 milliGRAM(s) IV Push three times a day PRN generalized tonic-clonic seizure lasting longer than 2 minutes, or two consecutive seizures without return to baseline in-between  melatonin 3 milliGRAM(s) Oral at bedtime PRN Insomnia  ondansetron Injectable 4 milliGRAM(s) IV Push every 8 hours PRN Nausea and/or Vomiting            VEEG in the last 24 hours:    Background - continuous, symmetrical, well organized, reaching frequencies in the range of 9-10Hz    Focal and generalized slowin. no generalized slowing  2. borderline to mild left FT focal slowing    Interictal activity - moderate to large number of left FT epileptiform sharp waves that mainly appear during sleep    Events - none    Seizures - none    Impression: Abnormal VEEG as above.    Plan -   Will discuss seizure medication options with patient   Epilepsy Attending Note:     BERNICE LIAO    29y Male  MRN [id number]    Vital Signs Last 24 Hrs  T(C): 36.2 (2024 04:44), Max: 36.8 (2024 11:29)  T(F): 97.1 (2024 04:44), Max: 98.3 (2024 11:29)  HR: 106 (2024 04:44) (90 - 106)  BP: 122/68 (2024 04:44) (112/59 - 173/81)  BP(mean): --  RR: 18 (2024 04:44) (18 - 18)  SpO2: 98% (2024 04:44) (97% - 100%)    Parameters below as of 2024 04:44  Patient On (Oxygen Delivery Method): room air                              13.9   10.71 )-----------( 316      ( 2024 06:27 )             42.3       04-19    140  |  104  |  15  ----------------------------<  99  4.4   |  24  |  1.0    Ca    8.9      2024 06:27    TPro  6.2  /  Alb  3.9  /  TBili  0.3  /  DBili  x   /  AST  15  /  ALT  23  /  AlkPhos  97  -      MEDICATIONS  (STANDING):  influenza   Vaccine 0.5 milliLiter(s) IntraMuscular once    MEDICATIONS  (PRN):  acetaminophen     Tablet .. 650 milliGRAM(s) Oral every 6 hours PRN Temp greater or equal to 38C (100.4F), Mild Pain (1 - 3)  aluminum hydroxide/magnesium hydroxide/simethicone Suspension 30 milliLiter(s) Oral every 4 hours PRN Dyspepsia  LORazepam   Injectable 2 milliGRAM(s) IV Push three times a day PRN generalized tonic-clonic seizure lasting longer than 2 minutes, or two consecutive seizures without return to baseline in-between  melatonin 3 milliGRAM(s) Oral at bedtime PRN Insomnia  ondansetron Injectable 4 milliGRAM(s) IV Push every 8 hours PRN Nausea and/or Vomiting            VEEG in the last 24 hours:    Background - continuous, symmetrical, well organized, reaching frequencies in the range of 9-10Hz    Focal and generalized slowin. no generalized slowing  2. borderline to mild left FT focal slowing    Interictal activity - moderate to large number of left FT epileptiform sharp waves that mainly appear during sleep    Events - none    Seizures - none    Impression: Abnormal VEEG as above.    Plan -   VEEG findings and seizure medication options with patient and wife  Start Keppra, give 250mg x 1 stat,  Give additional Keppra 250mg at 2 pm  Discharge on Keppra 500mg q12hr and vitamin B6  Follow up as scheduled with level  MRI brain as outpatient  Sleep study as outpatient  The importance of regular sleep, medication compliance and regular follow up reinforced  Per [place] law, no driving / operating machinery x 1 year until cleared

## 2024-04-19 NOTE — DISCHARGE NOTE PROVIDER - CARE PROVIDER_API CALL
Steve Magaña  Neurology  [address] [place] [address], [address] [address]  Phone: [phone]  Fax: [phone]  Scheduled Appointment: 05/15/2024

## 2024-04-19 NOTE — DISCHARGE NOTE PROVIDER - NSDCFUADDAPPT_GEN_ALL_CORE_FT
Follow up neurology appointment is scheduled with Dr. Magaña   for May 15, 2024 at 1 pm.    [address] [place] [address], [address]  [phone]

## 2024-04-19 NOTE — DISCHARGE NOTE NURSING/CASE MANAGEMENT/SOCIAL WORK - PATIENT PORTAL LINK FT
You can access the FollowMyHealth Patient Portal offered by [facility] by registering at the following website: [email or web address]. By joining [facility]’s FollowMyHealth portal, you will also be able to view your health information using other applications (apps) compatible with our system.

## 2024-04-19 NOTE — DISCHARGE NOTE PROVIDER - DATE OF DISCHARGE SERVICE:
Detail Level: Simple
Additional Notes: Advised patient to go to hematology/PCP for further evaluation. As she explains she’s bruising easily
19-Apr-2024

## 2024-04-19 NOTE — DISCHARGE NOTE PROVIDER - NSDCCPCAREPLAN_GEN_ALL_CORE_FT
PRINCIPAL DISCHARGE DIAGNOSIS  Diagnosis: Seizure  Assessment and Plan of Treatment: You where evaluated with EEG which revealed seizure activity.  You have been started on keppra. Continue the medication and follow up with neurology. You will need an MRI and labs priro to neurology follow up. They will contact when authorization for MRI obtained. Return to hospital if you have a seizure.

## 2024-04-19 NOTE — DISCHARGE NOTE PROVIDER - NSDCMRMEDTOKEN_GEN_ALL_CORE_FT
Keppra 500 mg oral tablet: 1 tab(s) orally every 12 hours  Vitamin B6 100 mg oral tablet: 1 tab(s) orally once a day

## 2024-04-19 NOTE — DISCHARGE NOTE PROVIDER - HOSPITAL COURSE
29 year old male with no significant pmhx presents with 3x episodes of seizures in last 3 months, most recently on day of admit, presents to ED for neurology and epilepsy eval.    # seizure disorder  - admitted to epilepsy unit [facility] site  - aspiration precautions, fall precautions, seizure precautions  - ativan for generalized tonic clonic seizure  - Epilepsy team consulted, veeg performed, which revealed seizure activity. Patient was started on Keppra  - Pt clinically improved and will be discharged to home with outpt neurology follow up.        29 year old male with no significant pmhx presents with 3x episodes of seizures in last 3 months, most recently on day of admit, presents to ED for neurology and epilepsy eval.    # seizure disorder  - admitted to epilepsy unit [facility] site  - aspiration precautions, fall precautions, seizure precautions  - ativan for generalized tonic clonic seizure  - Epilepsy team consulted, veeg performed, which revealed seizure activity. Patient was started on Keppra  - Pt clinically improved and will be discharged to home with outpt neurology follow up.

## 2024-04-20 NOTE — EEG REPORT - NS EEG TEXT BOX
BERNICE LIAO [id number]     Study Date: 4/19/24 08:00-13:48  Duration: 5 hr 28 min  --------------------------------------------------------------------------------------------------  History:  CC/ HPI Patient is a 29y old  Male who presents with a chief complaint of seizure (19 Apr 2024 13:32)    MEDICATIONS  (STANDING):    --------------------------------------------------------------------------------------------------  Study Interpretation:    [[[Abbreviation Key:  PDR=alpha rhythm/posterior dominant rhythm. A-P=anterior posterior.  Amplitude: ‘very low’:<20; ‘low’:20-49; ‘medium’:; ‘high’:>150uV.  Persistence for periodic/rhythmic patterns (% of epoch) ‘rare’:<1%; ‘occasional’:1-10%; ‘frequent’:10-50%; ‘abundant’:50-90%; ‘continuous’:>90%.  Persistence for sporadic discharges: ‘rare’:<1/hr; ‘occasional’:1/min-1/hr; ‘frequent’:>1/min; ‘abundant’:>1/10 sec.  RPP=rhythmic and periodic patterns; GRDA=generalized rhythmic delta activity; FIRDA=frontal intermittent GRDA; LRDA=lateralized rhythmic delta activity; TIRDA=temporal intermittent rhythmic delta activity;  LPD=PLED=lateralized periodic discharges; GPD=generalized periodic discharges; BIPDs =bilateral independent periodic discharges; Mf=multifocal; SIRPDs=stimulus induced rhythmic, periodic, or ictal appearing discharges; BIRDs=brief potentially ictal rhythmic discharges >4 Hz, lasting .5-10s; PFA (paroxysmal bursts >13 Hz or =8 Hz <10s).  Modifiers: +F=with fast component; +S=with spike component; +R=with rhythmic component.  S-B=burst suppression pattern.  Max=maximal. N1-drowsy; N2-stage II sleep; N3-slow wave sleep. SSS/BETS=small sharp spikes/benign epileptiform transients of sleep. HV=hyperventilation; PS=photic stimulation]]]    Daily EEG Visual Analysis    FINDINGS:      Background:  Continuity: Continuous  Symmetry: Symmetric  Posterior dominant rhythm (PDR): 10 Hz, reactive to eye closure. Symmetric low-amplitude frontal beta in wakefulness.  Voltage: Normal  Anterior-Posterior Gradient: Present  Other background findings: None  Breach: Absent    Background Slowing:  Generalized slowing: None  Focal slowing: None    State Changes:   Drowsiness is characterized by fragmentation, attenuation, and slowing of the background activity.  Stage 2 sleep is characterized by symmetric K complexes and sleep spindles.     Interictal Findings:  Occasional left frontotemporal (F7/T7) sharp-wave discharges in stage 2 sleep.    Electrographic and Electroclinical seizures:  None    Other Clinical Events:  Event button presses with no associated electrographic seizure:  08:03: No clinical change seen on video. Patient is lying in bed.  09:44: Patient is standing and walking next to the bed. No abnormal movements seen on video.    Activation Procedures:   Hyperventilation is performed does not elicit any abnormalities.    Photic stimulation is performed and does not elicit any abnormalities.      Artifacts:  Intermittent myogenic and movement artifacts are present.    EKG:  Single-lead EKG shows regular rhythm.    EEG Classification / Summary:  Abnormal video-EEG in the awake, drowsy, and asleep states.   -Occasional left frontotemporal sharp-wave discharges in stage 2 sleep.  -Event button presses as above with no EEG correlate.  -No electrographic seizures are captured.     Clinical Impression:  -Risk of focal-onset seizures from the left frontotemporal region  -No seizures captured          -------------------------------------------------------------------------------------------------------    Sharon Rico MD  Attending Physician, [facility] Epilepsy [place]    -------------------------------------------------------------------------------------------------------    To reach EEG technologist:  Please use the pager number for the appropriate hospital or contact the .  At [facility] - Pager #: [phone]    To reach EEG-reading physician:  [facility] EEG Reading Room Phone #: [phone]  Epilepsy Answering Service after 5PM and before 8:30AM: Phone #: [phone]     BERNICE LIAO [id number]    Study Date: 4/19/24 08:00-13:48  Duration: 5 hr 28 min  --------------------------------------------------------------------------------------------------  History:  CC/ HPI Patient is a 29y old  Male who presents with a chief complaint of seizure (19 Apr 2024 13:32)    MEDICATIONS  (STANDING):    --------------------------------------------------------------------------------------------------  Study Interpretation:    [[[Abbreviation Key:  PDR=alpha rhythm/posterior dominant rhythm. A-P=anterior posterior.  Amplitude: ‘very low’:<20; ‘low’:20-49; ‘medium’:; ‘high’:>150uV.  Persistence for periodic/rhythmic patterns (% of epoch) ‘rare’:<1%; ‘occasional’:1-10%; ‘frequent’:10-50%; ‘abundant’:50-90%; ‘continuous’:>90%.  Persistence for sporadic discharges: ‘rare’:<1/hr; ‘occasional’:1/min-1/hr; ‘frequent’:>1/min; ‘abundant’:>1/10 sec.  RPP=rhythmic and periodic patterns; GRDA=generalized rhythmic delta activity; FIRDA=frontal intermittent GRDA; LRDA=lateralized rhythmic delta activity; TIRDA=temporal intermittent rhythmic delta activity;  LPD=PLED=lateralized periodic discharges; GPD=generalized periodic discharges; BIPDs =bilateral independent periodic discharges; Mf=multifocal; SIRPDs=stimulus induced rhythmic, periodic, or ictal appearing discharges; BIRDs=brief potentially ictal rhythmic discharges >4 Hz, lasting .5-10s; PFA (paroxysmal bursts >13 Hz or =8 Hz <10s).  Modifiers: +F=with fast component; +S=with spike component; +R=with rhythmic component.  S-B=burst suppression pattern.  Max=maximal. N1-drowsy; N2-stage II sleep; N3-slow wave sleep. SSS/BETS=small sharp spikes/benign epileptiform transients of sleep. HV=hyperventilation; PS=photic stimulation]]]    Daily EEG Visual Analysis    FINDINGS:      Background:  Continuity: Continuous  Symmetry: Symmetric  Posterior dominant rhythm (PDR): 10 Hz, reactive to eye closure. Symmetric low-amplitude frontal beta in wakefulness.  Voltage: Normal  Anterior-Posterior Gradient: Present  Other background findings: None  Breach: Absent    Background Slowing:  Generalized slowing: None  Focal slowing: None    State Changes:  Drowsiness is characterized by fragmentation, attenuation, and slowing of the background activity.  Stage 2 sleep is characterized by symmetric K complexes and sleep spindles.    Interictal Findings:  Occasional left frontotemporal (F7/T7) sharp-wave discharges in stage 2 sleep.    Electrographic and Electroclinical seizures:  None    Other Clinical Events:  Event button presses with no associated electrographic seizure:  08:03: No clinical change seen on video. Patient is lying in bed.  09:13: No clinical change seen on video. Patient in in bed, awake, talking.  09:44: Patient is standing and walking next to the bed. No abnormal movements seen on video.    Activation Procedures:  Hyperventilation is performed does not elicit any abnormalities.    Photic stimulation is performed and does not elicit any abnormalities.      Artifacts:  Intermittent myogenic and movement artifacts are present.    EKG:  Single-lead EKG shows regular rhythm.    EEG Classification / Summary:  Abnormal video-EEG in the awake, drowsy, and asleep states.  -Occasional left frontotemporal sharp-wave discharges in stage 2 sleep.  -Event button presses as above with no EEG correlate.  -No electrographic seizures are captured.    Clinical Impression:  -Risk of focal-onset seizures from the left frontotemporal region  -No seizures captured          -------------------------------------------------------------------------------------------------------    Sharon Rico MD  Attending Physician, [facility]    -------------------------------------------------------------------------------------------------------    To reach EEG technologist:  Please use the pager number for the appropriate hospital or contact the .  At [facility] - Pager #: [phone]    To reach EEG-reading physician:  [facility] EEG Reading Room Phone #: [phone]  Epilepsy Answering Service after 5PM and before 8:30AM: Phone #: [phone]

## 2024-04-23 LAB
CARBOXYTHC UR CFM-MCNC: 3801 NG/ML — HIGH
TOXICOLOGIST REVIEW: POSITIVE — SIGNIFICANT CHANGE UP
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
## 2024-04-26 DIAGNOSIS — S20.229A CONTUSION OF UNSPECIFIED BACK WALL OF THORAX, INITIAL ENCOUNTER: ICD-10-CM

## 2024-04-26 DIAGNOSIS — F16.90 HALLUCINOGEN USE, UNSPECIFIED, UNCOMPLICATED: ICD-10-CM
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
## 2024-04-26 DIAGNOSIS — F17.290 NICOTINE DEPENDENCE, OTHER TOBACCO PRODUCT, UNCOMPLICATED: ICD-10-CM

## 2024-04-26 DIAGNOSIS — G40.109 LOCALIZATION-RELATED (FOCAL) (PARTIAL) SYMPTOMATIC EPILEPSY AND EPILEPTIC SYNDROMES WITH SIMPLE PARTIAL SEIZURES, NOT INTRACTABLE, WITHOUT STATUS EPILEPTICUS: ICD-10-CM

## 2024-04-26 DIAGNOSIS — S00.03XA CONTUSION OF SCALP, INITIAL ENCOUNTER: ICD-10-CM

## 2024-04-26 DIAGNOSIS — J45.909 UNSPECIFIED ASTHMA, UNCOMPLICATED: ICD-10-CM

## 2024-04-26 DIAGNOSIS — F12.90 CANNABIS USE, UNSPECIFIED, UNCOMPLICATED: ICD-10-CM

## 2024-05-16 ENCOUNTER — APPOINTMENT (OUTPATIENT)
Dept: NEUROLOGY | Facility: CLINIC | Age: 29
End: 2024-05-16

## 2024-05-30 ENCOUNTER — APPOINTMENT (OUTPATIENT)
Dept: PULMONOLOGY | Facility: CLINIC | Age: 29
End: 2024-05-30

## 2024-07-31 ENCOUNTER — TRANSCRIPTION ENCOUNTER (OUTPATIENT)
Age: 29
End: 2024-07-31

## 2024-07-31 ENCOUNTER — INPATIENT (INPATIENT)
Facility: HOSPITAL | Age: 29
LOS: 0 days | Discharge: LEFT BEFORE DISCHARGE | DRG: 53 | End: 2024-07-31
Attending: STUDENT IN AN ORGANIZED HEALTH CARE EDUCATION/TRAINING PROGRAM | Admitting: STUDENT IN AN ORGANIZED HEALTH CARE EDUCATION/TRAINING PROGRAM
Payer: MEDICAID

## 2024-07-31 VITALS
RESPIRATION RATE: 17 BRPM | WEIGHT: 289.91 LBS | SYSTOLIC BLOOD PRESSURE: 124 MMHG | TEMPERATURE: 98 F | OXYGEN SATURATION: 98 % | HEART RATE: 101 BPM | DIASTOLIC BLOOD PRESSURE: 82 MMHG

## 2024-07-31 VITALS
TEMPERATURE: 98 F | OXYGEN SATURATION: 96 % | DIASTOLIC BLOOD PRESSURE: 75 MMHG | HEART RATE: 84 BPM | SYSTOLIC BLOOD PRESSURE: 111 MMHG | RESPIRATION RATE: 18 BRPM

## 2024-07-31 DIAGNOSIS — R56.9 UNSPECIFIED CONVULSIONS: ICD-10-CM

## 2024-07-31 LAB
ALBUMIN SERPL ELPH-MCNC: 4.8 G/DL — SIGNIFICANT CHANGE UP (ref 3.5–5.2)
ALP SERPL-CCNC: 104 U/L — SIGNIFICANT CHANGE UP (ref 30–115)
ALT FLD-CCNC: 31 U/L — SIGNIFICANT CHANGE UP (ref 0–41)
ANION GAP SERPL CALC-SCNC: 11 MMOL/L — SIGNIFICANT CHANGE UP (ref 7–14)
AST SERPL-CCNC: 22 U/L — SIGNIFICANT CHANGE UP (ref 0–41)
BILIRUB SERPL-MCNC: 0.3 MG/DL — SIGNIFICANT CHANGE UP (ref 0.2–1.2)
BUN SERPL-MCNC: 13 MG/DL — SIGNIFICANT CHANGE UP (ref 10–20)
CALCIUM SERPL-MCNC: 9.5 MG/DL — SIGNIFICANT CHANGE UP (ref 8.4–10.5)
CHLORIDE SERPL-SCNC: 100 MMOL/L — SIGNIFICANT CHANGE UP (ref 98–110)
CO2 SERPL-SCNC: 26 MMOL/L — SIGNIFICANT CHANGE UP (ref 17–32)
CREAT SERPL-MCNC: 0.9 MG/DL — SIGNIFICANT CHANGE UP (ref 0.7–1.5)
EGFR: 119 ML/MIN/1.73M2 — SIGNIFICANT CHANGE UP
GLUCOSE SERPL-MCNC: 109 MG/DL — HIGH (ref 70–99)
HCT VFR BLD CALC: 46.8 % — SIGNIFICANT CHANGE UP (ref 42–52)
HGB BLD-MCNC: 15.4 G/DL — SIGNIFICANT CHANGE UP (ref 14–18)
MAGNESIUM SERPL-MCNC: 2.3 MG/DL — SIGNIFICANT CHANGE UP (ref 1.8–2.4)
MCHC RBC-ENTMCNC: 28.8 PG — SIGNIFICANT CHANGE UP (ref 27–31)
MCHC RBC-ENTMCNC: 32.9 G/DL — SIGNIFICANT CHANGE UP (ref 32–37)
MCV RBC AUTO: 87.5 FL — SIGNIFICANT CHANGE UP (ref 80–94)
NRBC # BLD: 0 /100 WBCS — SIGNIFICANT CHANGE UP (ref 0–0)
PLATELET # BLD AUTO: 372 K/UL — SIGNIFICANT CHANGE UP (ref 130–400)
PMV BLD: 9.2 FL — SIGNIFICANT CHANGE UP (ref 7.4–10.4)
POTASSIUM SERPL-MCNC: 4.9 MMOL/L — SIGNIFICANT CHANGE UP (ref 3.5–5)
POTASSIUM SERPL-SCNC: 4.9 MMOL/L — SIGNIFICANT CHANGE UP (ref 3.5–5)
PROT SERPL-MCNC: 7.2 G/DL — SIGNIFICANT CHANGE UP (ref 6–8)
RBC # BLD: 5.35 M/UL — SIGNIFICANT CHANGE UP (ref 4.7–6.1)
RBC # FLD: 13 % — SIGNIFICANT CHANGE UP (ref 11.5–14.5)
SODIUM SERPL-SCNC: 137 MMOL/L — SIGNIFICANT CHANGE UP (ref 135–146)
WBC # BLD: 17.8 K/UL — HIGH (ref 4.8–10.8)
WBC # FLD AUTO: 17.8 K/UL — HIGH (ref 4.8–10.8)

## 2024-07-31 PROCEDURE — 72125 CT NECK SPINE W/O DYE: CPT | Mod: 26,MC

## 2024-07-31 PROCEDURE — 70450 CT HEAD/BRAIN W/O DYE: CPT | Mod: 26,MC

## 2024-07-31 PROCEDURE — 99239 HOSP IP/OBS DSCHRG MGMT >30: CPT

## 2024-07-31 PROCEDURE — 99285 EMERGENCY DEPT VISIT HI MDM: CPT

## 2024-07-31 PROCEDURE — 93010 ELECTROCARDIOGRAM REPORT: CPT

## 2024-07-31 RX ORDER — GINGER ROOT/GINGER ROOT EXT 262.5 MG
100 CAPSULE ORAL DAILY
Refills: 0 | Status: DISCONTINUED | OUTPATIENT
Start: 2024-07-31 | End: 2024-07-31

## 2024-07-31 RX ORDER — LEVETIRACETAM 1000 MG/1
1500 TABLET, FILM COATED ORAL ONCE
Refills: 0 | Status: COMPLETED | OUTPATIENT
Start: 2024-07-31 | End: 2024-07-31

## 2024-07-31 RX ORDER — GINGER ROOT/GINGER ROOT EXT 262.5 MG
1 CAPSULE ORAL
Qty: 14 | Refills: 0
Start: 2024-07-31 | End: 2024-08-13

## 2024-07-31 RX ORDER — LEVETIRACETAM 1000 MG/1
1 TABLET, FILM COATED ORAL
Qty: 28 | Refills: 0
Start: 2024-07-31 | End: 2024-08-13

## 2024-07-31 RX ORDER — MULTIVITAMIN/IRON/FOLIC ACID 18MG-0.4MG
1 TABLET ORAL
Qty: 14 | Refills: 0
Start: 2024-07-31 | End: 2024-08-13

## 2024-07-31 RX ORDER — MULTIVITAMIN/IRON/FOLIC ACID 18MG-0.4MG
1 TABLET ORAL DAILY
Refills: 0 | Status: DISCONTINUED | OUTPATIENT
Start: 2024-07-31 | End: 2024-07-31

## 2024-07-31 RX ORDER — CHLORDIAZEPOXIDE HCL 10 MG
25 CAPSULE ORAL ONCE
Refills: 0 | Status: DISCONTINUED | OUTPATIENT
Start: 2024-07-31 | End: 2024-07-31

## 2024-07-31 RX ORDER — DEXTROSE MONOHYDRATE, SODIUM CHLORIDE, SODIUM LACTATE, CALCIUM CHLORIDE, MAGNESIUM CHLORIDE 1.5; 538; 448; 18.4; 5.08 G/100ML; MG/100ML; MG/100ML; MG/100ML; MG/100ML
1000 SOLUTION INTRAPERITONEAL ONCE
Refills: 0 | Status: COMPLETED | OUTPATIENT
Start: 2024-07-31 | End: 2024-07-31

## 2024-07-31 RX ADMIN — Medication 100 MILLIGRAM(S): at 18:01

## 2024-07-31 RX ADMIN — Medication 1 MILLIGRAM(S): at 18:01

## 2024-07-31 RX ADMIN — LEVETIRACETAM 400 MILLIGRAM(S): 1000 TABLET, FILM COATED ORAL at 14:37

## 2024-07-31 RX ADMIN — DEXTROSE MONOHYDRATE, SODIUM CHLORIDE, SODIUM LACTATE, CALCIUM CHLORIDE, MAGNESIUM CHLORIDE 1000 MILLILITER(S): 1.5; 538; 448; 18.4; 5.08 SOLUTION INTRAPERITONEAL at 13:48

## 2024-07-31 RX ADMIN — Medication 25 MILLIGRAM(S): at 15:11

## 2024-07-31 NOTE — DISCHARGE NOTE PROVIDER - CARE PROVIDER_API CALL
Steve Magaña  Neurology  [address] [place] [address], [address] [address]  Phone: [phone]  Fax: [phone]  Follow Up Time: 1 week

## 2024-07-31 NOTE — DISCHARGE NOTE NURSING/CASE MANAGEMENT/SOCIAL WORK - NSDCVIVACCINE_GEN_ALL_CORE_FT
Rohan Powers is a 48 y.o. female presents today for right inside of knee wart removal.           Pt is in Room # tx        1. Have you been to the ER, urgent care clinic since your last visit? Hospitalized since your last visit? No    2. Have you seen or consulted any other health care providers outside of the [facility] since your last visit? Include any pap smears or colon screening. No     Health Maintenance reviewed - .
Subjective:    Heidi Hdz is a 48 y.o. female who presents for lesion removal. We have discussed this procedure, including option of not performing surgery, technique of surgery and potential for scarring at an earlier visit. Oubjective:   Patient appears well. Visit Vitals    /50 (BP 1 Location: Left arm, BP Patient Position: Sitting)    Pulse 96    Temp 99.4 °F (37.4 °C) (Oral)    Resp 16    Ht 5' 5\" (1.651 m)    Wt 166 lb 6.4 oz (75.5 kg)    LMP 11/05/2011    SpO2 95%    BMI 27.69 kg/m2     Skin: 1 cm wart on right knee      Assessment:   warts    Procedure Note:   After informed consent was obtained,  with sterile technique, cryotherapy with liquid nitrogen was performed. Antibiotic dressing is applied, and wound care instructions provided. Be alert for any signs of cutaneous infection. The procedure was well tolerated without complications. Follow up: the patient may return prn.
No Vaccines Administered.

## 2024-07-31 NOTE — ED PROVIDER NOTE - ATTENDING APP SHARED VISIT CONTRIBUTION OF CARE
29 yr old m w/ a pmh significant for seizures, ETOH abuse who presents with seizures. As per pt, today he had a witnessed seizure at home. Pt states that he has not taken his keppra in the past three days. In addition, pt does reports recent etoh binging and chronic alcohol use. Pt denies any other medical complaints.     VITAL SIGNS: I have reviewed nursing notes and confirm.  CONSTITUTIONAL: non-toxic, well appearing  SKIN: no rash, no petechiae.  EYES: EOMI, pink conjunctiva, anicteric  ENT: tongue midline, no exudates, MMM  NECK: Supple; no meningismus, no JVD  CARD: RRR, no murmurs, equal radial pulses bilaterally 2+  RESP: CTAB, no respiratory distress  ABD: Soft, non-tender, non-distended, no peritoneal signs, no HSM, no CVA tenderness  EXT: Normal ROM x4. No edema. No calves tenderness  NEURO: Alert, oriented x3. CN2-12 intact, equal strength bilaterally, nl gait.  PSYCH: Cooperative, appropriate.      29 yr old m that presents s/p seizure. concern for etoh withdrawal. keppra, librium, ivf, imaging, labs. consider admission.

## 2024-07-31 NOTE — ED PROVIDER NOTE - PHYSICAL EXAMINATION
GENERAL: Well developed, well nourished and in no acute distress. Resting comfortably in bed.   HEENT: Normocephalic, atraumatic, mucous membranes moist, EOMI, PERRLA, bilateral sclera anicteric, no conjunctival injection   Neck: Supple, non-tender, no lymphadenopathy.   PULMONARY: Clear to auscultation bilaterally. No rales, ronchi, or wheezing.   CARDIOVASCULAR: Regular rate and rhythm, S1-S2, no murmurs   GASTROINTESTINAL: Soft, non-tender, non-distended, no guarding.   SKIN/EXTREMITIES: No clubbing or edema   NEUROLOGIC/MUSCULOSKELETAL: AOx4, grossly moving all extremities, no focal deficits.

## 2024-07-31 NOTE — ED PROVIDER NOTE - OBJECTIVE STATEMENT
29 year old male hx of seizures, EtOH abuse? here for seizures. Pt says he was on twitter watching illicit content when his vision became blurry and then, per wife had a seizure lasting 10 minutes. She tried to bring him to the bathroom to splash water, at which point seizure broke and he became agitated. Last took keppra 3 days ago. Also says he drank about 20 beers this past Sunday, pt not completely clear about daily alcohol use otherwise. No other complaints 29 year old male hx of seizures, EtOH abuse? here for seizures. Pt says he was using his phone when his vision became blurry and then, per wife had a seizure lasting 10 minutes. She tried to bring him to the bathroom to splash water, at which point seizure broke and he became agitated. Last took keppra 3 days ago. Also says he drank about 20 beers this past Sunday, pt not completely clear about daily alcohol use otherwise. No other complaints 29 year old male hx of seizures, EtOH abuse? here for seizures. Pt says he was using his phone when his vision became blurry and then, per wife had a seizure lasting 10 minutes. She tried to bring him to the bathroom to splash water, at which point seizure broke and he became agitated. Last took keppra 3 days ago. Also says he drank about 20 beers this past Sunday, pt not completely clear about daily alcohol use otherwise. No other complaints    Discussed case with ICU fellow Dr. Sparrow for admission to SDU for alcohol withdrawal seizures, recommended admission to general floors as seizures likely nonalcohol withdrawal related given pt is afebrile, nontremolous.

## 2024-07-31 NOTE — ED PROVIDER NOTE - CLINICAL SUMMARY MEDICAL DECISION MAKING FREE TEXT BOX
29 yr old m that presents s/p seizure. concern for etoh withdrawal. keppra, librium, ivf, imaging, labs. consider admission. Labs and EKG were ordered and reviewed.  Imaging was ordered and reviewed by me.  Appropriate medications for patient's presenting complaints were ordered and effects were reassessed.  Patient's records (prior hospital, ED visit, and/or nursing home notes if available) were reviewed.  Additional history was obtained from EMS, family, and/or PCP (where available).  Escalation to admission/observation was considered.  Patient requires inpatient hospitalization - medicine.

## 2024-07-31 NOTE — DISCHARGE NOTE PROVIDER - HOSPITAL COURSE
29 year old male hx of seizures, EtOH abuse? here for seizures. Pt says he was using his phone when his vision became blurry and then, per wife had a seizure lasting 10 minutes. She tried to bring him to the bathroom to splash water, at which point seizure broke and he became agitated. Last took keppra 3 days ago. Also says he drank about 20 beers this past Sunday, pt not completely clear about daily alcohol use otherwise. No other complaints      ED vitals: T 98, , /82, SpO2 98% on RA  labs significant for wbc 17k  CT head/cervical spine- unremarkable  s/p keppra load with 1500mg, 1L bolus    Patient admitted to medicine for Seizures likely 2/2 medication noncompliance. s/p keppra load and placed on ciwa protocol. Inpatient management was explained to patient including rEEG, neurology follow up ang blood work. Patient stated that he did not want to stay. Explained to patient that it would be AMA and he was ok with that.     29 year old male hx of seizures, EtOH abuse? here for seizures. Pt says he was using his phone when his vision became blurry and then, per wife had a seizure lasting 10 minutes. She tried to bring him to the bathroom to splash water, at which point seizure broke and he became agitated. Last took keppra 3 days ago. Also says he drank about 20 beers this past Sunday, pt not completely clear about daily alcohol use otherwise. No other complaints      ED vitals: T 98, , /82, SpO2 98% on RA  labs significant for wbc 17k  CT head/cervical spine- unremarkable  s/p keppra load with 1500mg, 1L bolus    Patient admitted to medicine for Seizures likely 2/2 medication noncompliance. s/p keppra load and placed on ciwa protocol. Inpatient management was explained to patient including rEEG, neurology follow up ang blood work. Patient stated that he did not want to stay. Explained to patient that it would be AMA and he was ok with that.    Attending addendum:   Pt admit that he is non compliance with his keppra   the patient insisted to leave AMA despite explaining to him multiple times the need to stay for observations and furhur work up for the seizure and SIRS ( WBC and HR) and for possible alcohol withdrawal , RISKs explained in details including but not limted to infection,  recurrent seizure, fall, injury, coma, death , but he insisted to leave   his wife at bedside , advised him not to drive or operate any machine until he see his neurologist , he stated understanding and he stated that he will follow with neurology , his stated that he  has some Keppra at home and asked for extra , so send keppra 500 mg bid .      29 year old male hx of seizures, EtOH abuse? here for seizures. Pt says he was using his phone when his vision became blurry and then, per wife had a seizure lasting 10 minutes. She tried to bring him to the bathroom to splash water, at which point seizure broke and he became agitated. Last took keppra 3 days ago. Also says he drank about 20 beers this past Sunday, pt not completely clear about daily alcohol use otherwise. No other complaints      ED vitals: T 98, , /82, SpO2 98% on RA  labs significant for wbc 17k  CT head/cervical spine- unremarkable  s/p keppra load with 1500mg, 1L bolus    Patient admitted to medicine for Seizures likely 2/2 medication noncompliance. s/p keppra load and placed on ciwa protocol. Inpatient management was explained to patient including rEEG, neurology follow up ang blood work. Patient stated that he did not want to stay. Explained to patient that it would be AMA and he was ok with that.    Attending addendum:   Pt admit that he is non compliance with his keppra   the patient insisted to leave AMA despite explaining to him multiple times the need to stay for observations and furhur work up for the seizure and SIRS ( WBC and HR) and for possible alcohol withdrawal , RISKs explained in details including but not limted to infection,  recurrent seizure, fall, injury, coma, death , but he insisted to leave   his wife at bedside , advised him not to drive or operate any machine until he see his neurologist , he stated understanding and he stated that he will follow with neurology , his stated that he  has some Keppra at home and asked for extra , so send keppra 500 mg bid . Also alcohol counseling provided , he denies being alcoholic but admit that he used more alcohol recently , send thiamin and folic acid      29 year old male hx of seizures, EtOH abuse? here for seizures. Pt says he was using his phone when his vision became blurry and then, per wife had a seizure lasting 10 minutes. She tried to bring him to the bathroom to splash water, at which point seizure broke and he became agitated. Last took keppra 3 days ago. Also says he drank about 20 beers this past Sunday, pt not completely clear about daily alcohol use otherwise. No other complaints      ED vitals: T 98, , /82, SpO2 98% on RA  labs significant for wbc 17k  CT head/cervical spine- unremarkable  s/p keppra load with 1500mg, 1L bolus    Patient admitted to medicine for Seizures likely 2/2 medication noncompliance. s/p keppra load and placed on ciwa protocol. Inpatient management was explained to patient including rEEG, neurology follow up ang blood work. Patient stated that he did not want to stay. Explained to patient that it would be AMA and he was ok with that.    Attending addendum:   Pt admit that he is non compliance with his keppra   the patient insisted to leave AMA despite explaining to him multiple times the need to stay for observations and furhur work up for the seizure and SIRS ( WBC and HR) and for possible alcohol withdrawal , RISKs explained in details including but not limted to infection,  recurrent seizure, fall, injury, coma, death , but he insisted to leave   his wife at bedside , advised him not to drive or operate any machine until he see his neurologist , he stated understanding and he stated that he will follow with neurology , his stated that he  has some Keppra at home and asked for extra , so send keppra 500 mg bid . Also alcohol counseling provided , he denies being alcoholic but admit that he used more alcohol recently , send thiamin and folic acid. F/u with his PCP and neurology  and repeat labs as soon as possible explained and he stated understanding

## 2024-07-31 NOTE — H&P ADULT - NSHPPHYSICALEXAM_GEN_ALL_CORE
LOS:     VITALS:   T(C): 36.6 (07-31-24 @ 15:58), Max: 36.7 (07-31-24 @ 12:10)  HR: 84 (07-31-24 @ 15:58) (84 - 101)  BP: 111/75 (07-31-24 @ 15:58) (111/75 - 132/73)  RR: 18 (07-31-24 @ 15:58) (17 - 18)  SpO2: 96% (07-31-24 @ 15:58) (96% - 99%)    GENERAL: NAD, lying in bed comfortably  HEAD:  Atraumatic, Normocephalic  EYES: EOMI, PERRLA, conjunctiva and sclera clear  ENT: Moist mucous membranes  NECK: Supple, No JVD  CHEST/LUNG: Clear to auscultation bilaterally; No rales, rhonchi, wheezing, or rubs. Unlabored respirations  HEART: Regular rate and rhythm; No murmurs, rubs, or gallops  ABDOMEN: BSx4; Soft, nontender, nondistended  EXTREMITIES:  2+ Peripheral Pulses, brisk capillary refill. No clubbing, cyanosis, or edema  NERVOUS SYSTEM:  A&Ox3, no focal deficits   SKIN: No rashes or lesions

## 2024-07-31 NOTE — DISCHARGE NOTE PROVIDER - NSDCMRMEDTOKEN_GEN_ALL_CORE_FT
folic acid 1 mg oral tablet: 1 tab(s) orally once a day  levETIRAcetam 500 mg oral tablet: 1 tab(s) orally 2 times a day  thiamine 100 mg oral tablet: 1 tab(s) orally once a day

## 2024-07-31 NOTE — DISCHARGE NOTE PROVIDER - NSDCCPCAREPLAN_GEN_ALL_CORE_FT
PRINCIPAL DISCHARGE DIAGNOSIS  Diagnosis: Seizure  Assessment and Plan of Treatment: You were evaluated in the hospital for seizures. You were loaded with keppra. Currently, you are not stable to be discharged from the hospital and it is against medical advice. Please continue taking your medications as prescribed.  After discharge, you will need to:   - Follow up with your primary care doctor within 1-2 weeks  - Follow up with neurology within 1-2 weeks.  - Take all the medications you were discharged with, unless otherwise instructed by your healthcare provider(s).   Please follow up with your providers by calling them to make an appointment so that you can see them in 1-2weeks; bring your paperwork from this hospital stay to that visit. You can access your visit information by signing up for an account for the patient portal at [email or web address]   Seek immediate medical attention if you develop fevers, chills, chest pain, shortness of breath, nausea and vomiting, abdominal pain, passing out, weakness or numbness or tingling on one side of your body, or any other concerning signs or symptoms.

## 2024-07-31 NOTE — ED ADULT TRIAGE NOTE - CHIEF COMPLAINT QUOTE
pt with hx of seizures. had seizure today witnessed by wife lasting 10 mins. Pt awake and alert , ambulatory in triage. Pt reports hitting head.

## 2024-07-31 NOTE — ED ADULT NURSE NOTE - OBJECTIVE STATEMENT
pt with hx of seizures. had seizure today witnessed by wife lasting 10 mins. post ical pt was aggressive, foaming at the mouth.  Pt awake and alert. pt reports bitting his tongue. denies incontinence. pt states he is non-compliant with medication.

## 2024-07-31 NOTE — H&P ADULT - ATTENDING COMMENTS
My note supersedes all residents notes that I sign, My correction for their notes are in my notes   Pt decided to leave AMA before I evaluate him for the admission   please refer to discharge note addendum  for details

## 2024-07-31 NOTE — H&P ADULT - ASSESSMENT
29 year old male hx of seizures, EtOH abuse? here for seizures. Pt says he was using his phone when his vision became blurry and then, per wife had a seizure lasting 10 minutes. She tried to bring him to the bathroom to splash water, at which point seizure broke and he became agitated. Last took keppra 3 days ago. Also says he drank about 20 beers this past Sunday, pt not completely clear about daily alcohol use otherwise. No other complaints    #Seizures 2/2 medication noncompliance  -s/p keppra load with 1500mg  -c/w keppra  -f/u keppra level  -f/u neuro consult  -rEEG      #Alcohol use  -[facility] protocol  -addiction medicine

## 2024-07-31 NOTE — DISCHARGE NOTE NURSING/CASE MANAGEMENT/SOCIAL WORK - NSDCPEFALRISK_GEN_ALL_CORE
For information on Fall & Injury Prevention, visit: [email or web address] OR  [email or web address] OR  https://www.cdc.gov/steadi/patient.html

## 2024-07-31 NOTE — H&P ADULT - NSHPPOAPULMEMBOLUS_GEN_A_CORE
Disc spaces maintained on xray today. No focal neuro deficits on PE. This certainly seems to be a neuropathic etiology. Pt has apparently been diagnosed with fibromyalgia in the past. Discussed tx options. Gabapentin may offer benefit. Will start trial. Reviewed s/e.
no

## 2024-07-31 NOTE — DISCHARGE NOTE PROVIDER - ATTENDING DISCHARGE PHYSICAL EXAMINATION:
On exam  General: awake, alert, oriented x3 , NAD, obese   Lungs:  normal resp effort, equal breath sounds and clear   Heart: regular ryhthm   Abdomen: soft, non tender non distended  Ext: no edema, can move all  his extremities

## 2024-07-31 NOTE — DISCHARGE NOTE NURSING/CASE MANAGEMENT/SOCIAL WORK - PATIENT PORTAL LINK FT
You can access the FollowMyHealth Patient Portal offered by [facility] by registering at the following website: [email or web address]. By joining [employer]’s FollowMyHealth portal, you will also be able to view your health information using other applications (apps) compatible with our system. Erivedge Counseling- I discussed with the patient the risks of Erivedge including but not limited to nausea, vomiting, diarrhea, constipation, weight loss, changes in the sense of taste, decreased appetite, muscle spasms, and hair loss.  The patient verbalized understanding of the proper use and possible adverse effects of Erivedge.  All of the patient's questions and concerns were addressed.

## 2024-07-31 NOTE — H&P ADULT - HISTORY OF PRESENT ILLNESS
29 year old male hx of seizures, EtOH abuse? here for seizures. Pt says he was using his phone when his vision became blurry and then, per wife had a seizure lasting 10 minutes. She tried to bring him to the bathroom to splash water, at which point seizure broke and he became agitated. Last took keppra 3 days ago. Also says he drank about 20 beers this past Sunday, pt not completely clear about daily alcohol use otherwise. No other complaints    ED vitals: T 98, , /82, SpO2 98% on RA  labs significant for wbc 17k  CT head/cervical spine- unremarkable    s/p keppra load with 1500mg, 1L bolus    Patient admitted to medicine for further management

## 2024-08-07 DIAGNOSIS — Z91.148 PATIENT'S OTHER NONCOMPLIANCE WITH MEDICATION REGIMEN FOR OTHER REASON: ICD-10-CM

## 2024-08-07 DIAGNOSIS — T42.6X6A UNDERDOSING OF OTHER ANTIEPILEPTIC AND SEDATIVE-HYPNOTIC DRUGS, INITIAL ENCOUNTER: ICD-10-CM

## 2024-08-07 DIAGNOSIS — F10.90 ALCOHOL USE, UNSPECIFIED, UNCOMPLICATED: ICD-10-CM

## 2024-08-07 DIAGNOSIS — R56.9 UNSPECIFIED CONVULSIONS: ICD-10-CM
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
## 2024-08-16 RX ORDER — LEVETIRACETAM 1000 MG/1
500 TABLET, FILM COATED ORAL
Refills: 0 | DISCHARGE

## 2024-10-23 ENCOUNTER — APPOINTMENT (OUTPATIENT)
Dept: NEUROLOGY | Facility: CLINIC | Age: 29
End: 2024-10-23
Payer: MEDICAID

## 2024-10-23 VITALS
OXYGEN SATURATION: 99 % | RESPIRATION RATE: 18 BRPM | BODY MASS INDEX: 49.51 KG/M2 | WEIGHT: 290 LBS | HEART RATE: 88 BPM | DIASTOLIC BLOOD PRESSURE: 84 MMHG | SYSTOLIC BLOOD PRESSURE: 132 MMHG | HEIGHT: 64 IN

## 2024-10-23 DIAGNOSIS — R56.9 UNSPECIFIED CONVULSIONS: ICD-10-CM

## 2024-10-23 PROCEDURE — 99214 OFFICE O/P EST MOD 30 MIN: CPT

## 2024-10-23 RX ORDER — LACTOBACILLUS ACIDOPHILUS/PECT 30 MG-20MG
TABLET ORAL
Refills: 0 | Status: ACTIVE | COMMUNITY

## 2024-10-23 RX ORDER — LEVETIRACETAM 500 MG/1
500 TABLET, FILM COATED ORAL TWICE DAILY
Qty: 60 | Refills: 0 | Status: ACTIVE | COMMUNITY
Start: 2024-10-23 | End: 1900-01-01

## 2024-10-23 RX ORDER — LEVETIRACETAM 500 MG/1
500 TABLET, FILM COATED ORAL TWICE DAILY
Refills: 0 | Status: ACTIVE | COMMUNITY

## 2024-10-24 PROBLEM — R56.9 UNSPECIFIED CONVULSIONS: Chronic | Status: ACTIVE | Noted: 2024-07-31

## 2025-02-12 ENCOUNTER — EMERGENCY (EMERGENCY)
Facility: HOSPITAL | Age: 30
LOS: 0 days | Discharge: HOME | End: 2025-02-12
Attending: STUDENT IN AN ORGANIZED HEALTH CARE EDUCATION/TRAINING PROGRAM
Payer: MEDICAID

## 2025-02-12 VITALS
OXYGEN SATURATION: 99 % | TEMPERATURE: 98 F | DIASTOLIC BLOOD PRESSURE: 68 MMHG | WEIGHT: 285.06 LBS | HEIGHT: 65 IN | HEART RATE: 99 BPM | RESPIRATION RATE: 18 BRPM | SYSTOLIC BLOOD PRESSURE: 108 MMHG

## 2025-02-12 DIAGNOSIS — F17.210 NICOTINE DEPENDENCE, CIGARETTES, UNCOMPLICATED: ICD-10-CM

## 2025-02-12 DIAGNOSIS — R10.9 UNSPECIFIED ABDOMINAL PAIN: ICD-10-CM

## 2025-02-12 DIAGNOSIS — R56.9 UNSPECIFIED CONVULSIONS: ICD-10-CM

## 2025-02-12 DIAGNOSIS — Z86.69 PERSONAL HISTORY OF OTHER DISEASES OF THE NERVOUS SYSTEM AND SENSE ORGANS: ICD-10-CM

## 2025-02-12 DIAGNOSIS — R19.7 DIARRHEA, UNSPECIFIED: ICD-10-CM

## 2025-02-12 LAB
ALBUMIN SERPL ELPH-MCNC: 4.3 G/DL — SIGNIFICANT CHANGE UP (ref 3.5–5.2)
ALP SERPL-CCNC: 104 U/L — SIGNIFICANT CHANGE UP (ref 30–115)
ALT FLD-CCNC: 17 U/L — SIGNIFICANT CHANGE UP (ref 0–41)
ANION GAP SERPL CALC-SCNC: 11 MMOL/L — SIGNIFICANT CHANGE UP (ref 7–14)
AST SERPL-CCNC: 15 U/L — SIGNIFICANT CHANGE UP (ref 0–41)
BASOPHILS # BLD AUTO: 0.01 K/UL — SIGNIFICANT CHANGE UP (ref 0–0.2)
BASOPHILS NFR BLD AUTO: 0.1 % — SIGNIFICANT CHANGE UP (ref 0–1)
BILIRUB SERPL-MCNC: 0.4 MG/DL — SIGNIFICANT CHANGE UP (ref 0.2–1.2)
BUN SERPL-MCNC: 15 MG/DL — SIGNIFICANT CHANGE UP (ref 10–20)
CALCIUM SERPL-MCNC: 8.6 MG/DL — SIGNIFICANT CHANGE UP (ref 8.4–10.5)
CHLORIDE SERPL-SCNC: 103 MMOL/L — SIGNIFICANT CHANGE UP (ref 98–110)
CO2 SERPL-SCNC: 21 MMOL/L — SIGNIFICANT CHANGE UP (ref 17–32)
CREAT SERPL-MCNC: 0.9 MG/DL — SIGNIFICANT CHANGE UP (ref 0.7–1.5)
EGFR: 119 ML/MIN/1.73M2 — SIGNIFICANT CHANGE UP
EOSINOPHIL # BLD AUTO: 0.02 K/UL — SIGNIFICANT CHANGE UP (ref 0–0.7)
EOSINOPHIL NFR BLD AUTO: 0.1 % — SIGNIFICANT CHANGE UP (ref 0–8)
GLUCOSE SERPL-MCNC: 103 MG/DL — HIGH (ref 70–99)
HCT VFR BLD CALC: 44.4 % — SIGNIFICANT CHANGE UP (ref 42–52)
HGB BLD-MCNC: 14.8 G/DL — SIGNIFICANT CHANGE UP (ref 14–18)
IMM GRANULOCYTES NFR BLD AUTO: 0.5 % — HIGH (ref 0.1–0.3)
LYMPHOCYTES # BLD AUTO: 0.68 K/UL — LOW (ref 1.2–3.4)
LYMPHOCYTES # BLD AUTO: 5.1 % — LOW (ref 20.5–51.1)
MAGNESIUM SERPL-MCNC: 2 MG/DL — SIGNIFICANT CHANGE UP (ref 1.8–2.4)
MCHC RBC-ENTMCNC: 28.9 PG — SIGNIFICANT CHANGE UP (ref 27–31)
MCHC RBC-ENTMCNC: 33.3 G/DL — SIGNIFICANT CHANGE UP (ref 32–37)
MCV RBC AUTO: 86.7 FL — SIGNIFICANT CHANGE UP (ref 80–94)
MONOCYTES # BLD AUTO: 0.41 K/UL — SIGNIFICANT CHANGE UP (ref 0.1–0.6)
MONOCYTES NFR BLD AUTO: 3 % — SIGNIFICANT CHANGE UP (ref 1.7–9.3)
NEUTROPHILS # BLD AUTO: 12.26 K/UL — HIGH (ref 1.4–6.5)
NEUTROPHILS NFR BLD AUTO: 91.2 % — HIGH (ref 42.2–75.2)
NRBC BLD AUTO-RTO: 0 /100 WBCS — SIGNIFICANT CHANGE UP (ref 0–0)
PHOSPHATE SERPL-MCNC: 2.1 MG/DL — SIGNIFICANT CHANGE UP (ref 2.1–4.9)
PLATELET # BLD AUTO: 295 K/UL — SIGNIFICANT CHANGE UP (ref 130–400)
PMV BLD: 9.9 FL — SIGNIFICANT CHANGE UP (ref 7.4–10.4)
POTASSIUM SERPL-MCNC: 4.6 MMOL/L — SIGNIFICANT CHANGE UP (ref 3.5–5)
POTASSIUM SERPL-SCNC: 4.6 MMOL/L — SIGNIFICANT CHANGE UP (ref 3.5–5)
PROT SERPL-MCNC: 6.7 G/DL — SIGNIFICANT CHANGE UP (ref 6–8)
RBC # BLD: 5.12 M/UL — SIGNIFICANT CHANGE UP (ref 4.7–6.1)
RBC # FLD: 12.8 % — SIGNIFICANT CHANGE UP (ref 11.5–14.5)
SODIUM SERPL-SCNC: 135 MMOL/L — SIGNIFICANT CHANGE UP (ref 135–146)
WBC # BLD: 13.45 K/UL — HIGH (ref 4.8–10.8)
WBC # FLD AUTO: 13.45 K/UL — HIGH (ref 4.8–10.8)

## 2025-02-12 PROCEDURE — 71046 X-RAY EXAM CHEST 2 VIEWS: CPT | Mod: 26

## 2025-02-12 PROCEDURE — 84100 ASSAY OF PHOSPHORUS: CPT

## 2025-02-12 PROCEDURE — 82962 GLUCOSE BLOOD TEST: CPT

## 2025-02-12 PROCEDURE — 80177 DRUG SCRN QUAN LEVETIRACETAM: CPT

## 2025-02-12 PROCEDURE — 80053 COMPREHEN METABOLIC PANEL: CPT

## 2025-02-12 PROCEDURE — 36000 PLACE NEEDLE IN VEIN: CPT

## 2025-02-12 PROCEDURE — 71046 X-RAY EXAM CHEST 2 VIEWS: CPT

## 2025-02-12 PROCEDURE — 99283 EMERGENCY DEPT VISIT LOW MDM: CPT | Mod: 25

## 2025-02-12 PROCEDURE — 85025 COMPLETE CBC W/AUTO DIFF WBC: CPT

## 2025-02-12 PROCEDURE — 83735 ASSAY OF MAGNESIUM: CPT

## 2025-02-12 PROCEDURE — 36415 COLL VENOUS BLD VENIPUNCTURE: CPT

## 2025-02-12 PROCEDURE — 99284 EMERGENCY DEPT VISIT MOD MDM: CPT

## 2025-02-12 RX ORDER — BACTERIOSTATIC SODIUM CHLORIDE 0.9 %
1000 VIAL (ML) INJECTION ONCE
Refills: 0 | Status: COMPLETED | OUTPATIENT
Start: 2025-02-12 | End: 2025-02-12

## 2025-02-12 RX ADMIN — Medication 2000 MILLILITER(S): at 15:09

## 2025-02-12 NOTE — ED PROVIDER NOTE - OBJECTIVE STATEMENT
29-year-old male with a past medical history of seizures presents after a seizure.  Patient states the seizure was witnessed by his 6-year-old son and states it lasted for about 10 to 15 minutes.  Patient states to have felt "out of it" immediately after but feels back to himself now.  Patient denies any incontinence. pt mentions to have caught a stomach bug yesterday and experiencing NB diarrhea. pt denies any other symptoms including fevers, chill, headache, recent illness/travel, cough, abdominal pain, chest pain, or SOB.

## 2025-02-12 NOTE — ED ADULT NURSE NOTE - CCCP TRG CHIEF CMPLNT
[FreeTextEntry1] : * low cholesterol, low triglycerides diet,dietary counseling given; dietary avoidance discussed; diet and exercise reviewed with patient\par * c/w vitamin d supplement, refilled\par * schedule f/u in six months. seizures

## 2025-02-12 NOTE — ED ADULT TRIAGE NOTE - TEMPERATURE IN CELSIUS (DEGREES C)
Detail Level: Detailed
Render Risk Assessment In Note?: no
Additional Notes: Advised patient to use OTC Cerave SA
36.6

## 2025-02-12 NOTE — ED PROVIDER NOTE - NSFOLLOWUPINSTRUCTIONS_ED_ALL_ED_FT
Please follow up with your primary care physician within 24-72 hours and return immediately if symptoms worsen.    Our Emergency Department Referral Coordinators will be reaching out to you in the next 24-48 hours from 9:00am to 5:00pm with a follow up appointment. Please expect a phone call from the hospital in that time frame. If you do not receive a call or if you have any questions or concerns, you can reach them at   [phone]    Seizure, Adult  When you have a seizure:  Parts of your body may move.  You may have a change in how aware or awake (conscious) you are.  You may shake (convulse).  Seizures usually last from 30 seconds to 2 minutes. Usually, they are not harmful unless they last a long time.    What are the signs or symptoms?  Common symptoms of this condition include:  Shaking (convulsions).  Stiffness in the body.  Passing out (losing consciousness).  Uncontrolled movements in the:  Arms or legs.  Eyes.  Head.  Mouth.  Some people have symptoms right before a seizure happens. These symptoms may include:  Fear.  Worry (anxiety).  Feeling like you are going to throw up (nausea).  Feeling like the room is spinning (vertigo).  Feeling like you saw or heard something before (déjà vu).  Odd tastes or smells.  Changes in vision, such as seeing flashing lights or spots.  Follow these instructions at home:  Medicines     Image   Take over-the-counter and prescription medicines only as told by your doctor.  Do not eat or drink anything that may keep your medicine from working, such as alcohol.  Activity     Do not do any activities that would be dangerous if you had another seizure, like driving or swimming. Wait until your doctor says it is safe for you to do them.  If you live in the [place] ask your local DMV (department of [employer]) when you can drive.  Get plenty of rest.  Teaching others     Image   Teach friends and family what to do when you have a seizure. They should:  Lay you on the ground.  Protect your head and body.  Loosen any tight clothing around your neck.  Turn you on your side.  Not hold you down.  Not put anything into your mouth.  Know whether or not you need emergency care.  Stay with you until you are better.  General instructions     Contact your doctor each time you have a seizure.  Avoid anything that gives you seizures.  Keep a seizure diary. Write down:  What you think caused each seizure.  What you remember about each seizure.  Keep all follow-up visits as told by your doctor. This is important.  Contact a doctor if:  You have another seizure.  You have seizures more often.  There is any change in what happens during your seizures.  You keep having seizures with treatment.  You have symptoms of being sick or having an infection.  Get help right away if:  You have a seizure:  That lasts longer than 5 minutes.  That is different than seizures you had before.  That makes it harder to breathe.  After you hurt your head.  After a seizure, you cannot speak or use a part of your body.  After a seizure, you are confused or have a bad headache.  You have two or more seizures in a row.  You are having seizures more often.  You do not wake up right after a seizure.  You get hurt during a seizure.  In an emergency:     These symptoms may be an emergency. Do not wait to see if the symptoms will go away. Get medical help right away. Call your local emergency services (911 in the [place] Do not drive yourself to the hospital.   Summary  Seizures usually last from 30 seconds to 2 minutes. Usually, they are not harmful unless they last a long time.  Do not eat or drink anything that may keep your medicine from working, such as alcohol.  Teach friends and family what to do when you have a seizure.  Contact your doctor each time you have a seizure.  This information is not intended to replace advice given to you by your health care provider. Make sure you discuss any questions you have with your health care provider

## 2025-02-12 NOTE — ED PROVIDER NOTE - CLINICAL SUMMARY MEDICAL DECISION MAKING FREE TEXT BOX
pt with seizure disorder compliant with medication with increased stress and seizure like episode, + uri symptoms    follows with Dr. Avalos    Appropriate medications for patient's presenting complaints were ordered and effects were reassessed. Patient's external records were reviewed    Escalation to admission and/or observation was considered.  Patient feels much better and is comfortable with discharge.  Appropriate follow-up was arranged.

## 2025-02-12 NOTE — ED ADULT TRIAGE NOTE - CHIEF COMPLAINT QUOTE
BIBA from home for witnessed seizure-like activity around 12:18 today. witnessed by pt's son, pt reports he was seizing for 10-15 minutes. seizure resolved on its own. BIBA from home for witnessed seizure-like activity around 12:18 today. witnessed by pt's son, pt reports he was seizing for 10-15 minutes. seizure resolved on its own. pt A&Ox4 in triage, returned to baseline BIBA from home for witnessed seizure-like activity around 12:18 today. witnessed by pt's son, pt reports he was seizing for 10-15 minutes. seizure resolved on its own. pt A&Ox4 in triage, returned to baseline.  FS = 102 in triage

## 2025-02-12 NOTE — ED ADULT NURSE NOTE - CHIEF COMPLAINT QUOTE
BIBA from home for witnessed seizure-like activity around 12:18 today. witnessed by pt's son, pt reports he was seizing for 10-15 minutes. seizure resolved on its own. pt A&Ox4 in triage, returned to baseline.  FS = 102 in triage

## 2025-02-12 NOTE — ED ADULT NURSE NOTE - NSFALLUNIVINTERV_ED_ALL_ED
Bed/Stretcher in lowest position, wheels locked, appropriate side rails in place/Call bell, personal items and telephone in reach/Instruct patient to call for assistance before getting out of bed/chair/stretcher/Non-slip footwear applied when patient is off stretcher/[place] to call system/Physically safe environment - no spills, clutter or unnecessary equipment/Purposeful proactive rounding/Room/bathroom lighting operational, light cord in reach

## 2025-02-12 NOTE — ED PROVIDER NOTE - PATIENT PORTAL LINK FT
You can access the FollowMyHealth Patient Portal offered by [facility] by registering at the following website: [email or web address]. By joining [employer]’s FollowMyHealth portal, you will also be able to view your health information using other applications (apps) compatible with our system.

## 2025-02-12 NOTE — ED PROVIDER NOTE - NS ED MD DISPO DISCHARGE
BSMART assessment completed, and suicide risk level noted to be low. Charge Nurse, Ismael Garcia and Physician, Dr. John Glez notified. Concerns not observed. Security/Off- has not been notified. Home

## 2025-02-16 LAB — LEVETIRACETAM SERPL-MCNC: 11.6 UG/ML — SIGNIFICANT CHANGE UP (ref 10–40)

## 2025-04-02 ENCOUNTER — APPOINTMENT (OUTPATIENT)
Dept: NEUROLOGY | Facility: CLINIC | Age: 30
End: 2025-04-02

## 2025-05-22 NOTE — ED ADULT TRIAGE NOTE - BP NONINVASIVE SYSTOLIC (MM HG)
Called patient to ask if he needs appointment with ID or if he is following up with . States \"I do not need to be seen at this time\" States is being treated by urologist and no need for IV antibiotics.   Advised to call our office if need should arise. Patient agreeable and verbalizes understanding.     
Patient called on 5/19/25 to schedule appt. with Dr. Coronel but he ended the call when I told him the next avail. was 7/1 I was trying to tell him that I was going to ask the nurse where can we squeeze him in but he hanged up before I got to tell him   
124

## 2025-07-03 ENCOUNTER — EMERGENCY (EMERGENCY)
Facility: HOSPITAL | Age: 30
LOS: 0 days | Discharge: HOME | End: 2025-07-03
Attending: STUDENT IN AN ORGANIZED HEALTH CARE EDUCATION/TRAINING PROGRAM
Payer: MEDICAID

## 2025-07-03 VITALS
TEMPERATURE: 99 F | SYSTOLIC BLOOD PRESSURE: 165 MMHG | OXYGEN SATURATION: 98 % | DIASTOLIC BLOOD PRESSURE: 98 MMHG | RESPIRATION RATE: 18 BRPM | HEART RATE: 97 BPM

## 2025-07-03 VITALS
DIASTOLIC BLOOD PRESSURE: 80 MMHG | WEIGHT: 274.92 LBS | TEMPERATURE: 99 F | HEIGHT: 64 IN | SYSTOLIC BLOOD PRESSURE: 116 MMHG | OXYGEN SATURATION: 96 % | RESPIRATION RATE: 20 BRPM | HEART RATE: 105 BPM

## 2025-07-03 DIAGNOSIS — F17.200 NICOTINE DEPENDENCE, UNSPECIFIED, UNCOMPLICATED: ICD-10-CM
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
## 2025-07-03 DIAGNOSIS — R42 DIZZINESS AND GIDDINESS: ICD-10-CM
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
## 2025-07-03 DIAGNOSIS — R56.9 UNSPECIFIED CONVULSIONS: ICD-10-CM

## 2025-07-03 DIAGNOSIS — G40.909 EPILEPSY, UNSPECIFIED, NOT INTRACTABLE, WITHOUT STATUS EPILEPTICUS: ICD-10-CM

## 2025-07-03 DIAGNOSIS — S01.112A LACERATION WITHOUT FOREIGN BODY OF LEFT EYELID AND PERIOCULAR AREA, INITIAL ENCOUNTER: ICD-10-CM

## 2025-07-03 DIAGNOSIS — F10.20 ALCOHOL DEPENDENCE, UNCOMPLICATED: ICD-10-CM

## 2025-07-03 LAB
ALBUMIN SERPL ELPH-MCNC: 4.5 G/DL — SIGNIFICANT CHANGE UP (ref 3.5–5.2)
ALP SERPL-CCNC: 105 U/L — SIGNIFICANT CHANGE UP (ref 30–115)
ALT FLD-CCNC: 43 U/L — HIGH (ref 0–41)
ANION GAP SERPL CALC-SCNC: 12 MMOL/L — SIGNIFICANT CHANGE UP (ref 7–14)
APTT BLD: 30.4 SEC — SIGNIFICANT CHANGE UP (ref 27–39.2)
AST SERPL-CCNC: 23 U/L — SIGNIFICANT CHANGE UP (ref 0–41)
BASOPHILS # BLD AUTO: 0.05 K/UL — SIGNIFICANT CHANGE UP (ref 0–0.2)
BASOPHILS NFR BLD AUTO: 0.3 % — SIGNIFICANT CHANGE UP (ref 0–1)
BILIRUB SERPL-MCNC: 0.3 MG/DL — SIGNIFICANT CHANGE UP (ref 0.2–1.2)
BUN SERPL-MCNC: 14 MG/DL — SIGNIFICANT CHANGE UP (ref 10–20)
CALCIUM SERPL-MCNC: 9.2 MG/DL — SIGNIFICANT CHANGE UP (ref 8.4–10.5)
CHLORIDE SERPL-SCNC: 103 MMOL/L — SIGNIFICANT CHANGE UP (ref 98–110)
CO2 SERPL-SCNC: 22 MMOL/L — SIGNIFICANT CHANGE UP (ref 17–32)
CREAT SERPL-MCNC: 0.9 MG/DL — SIGNIFICANT CHANGE UP (ref 0.7–1.5)
EGFR: 119 ML/MIN/1.73M2 — SIGNIFICANT CHANGE UP
EGFR: 119 ML/MIN/1.73M2 — SIGNIFICANT CHANGE UP
EOSINOPHIL # BLD AUTO: 0.06 K/UL — SIGNIFICANT CHANGE UP (ref 0–0.7)
EOSINOPHIL NFR BLD AUTO: 0.4 % — SIGNIFICANT CHANGE UP (ref 0–8)
GLUCOSE SERPL-MCNC: 111 MG/DL — HIGH (ref 70–99)
HCT VFR BLD CALC: 45.6 % — SIGNIFICANT CHANGE UP (ref 42–52)
HGB BLD-MCNC: 15.3 G/DL — SIGNIFICANT CHANGE UP (ref 14–18)
IMM GRANULOCYTES NFR BLD AUTO: 0.4 % — HIGH (ref 0.1–0.3)
INR BLD: 0.88 RATIO — SIGNIFICANT CHANGE UP (ref 0.65–1.3)
LYMPHOCYTES # BLD AUTO: 1.53 K/UL — SIGNIFICANT CHANGE UP (ref 1.2–3.4)
LYMPHOCYTES # BLD AUTO: 10.3 % — LOW (ref 20.5–51.1)
MCHC RBC-ENTMCNC: 28.8 PG — SIGNIFICANT CHANGE UP (ref 27–31)
MCHC RBC-ENTMCNC: 33.6 G/DL — SIGNIFICANT CHANGE UP (ref 32–37)
MCV RBC AUTO: 85.9 FL — SIGNIFICANT CHANGE UP (ref 80–94)
MONOCYTES # BLD AUTO: 0.67 K/UL — HIGH (ref 0.1–0.6)
MONOCYTES NFR BLD AUTO: 4.5 % — SIGNIFICANT CHANGE UP (ref 1.7–9.3)
NEUTROPHILS # BLD AUTO: 12.52 K/UL — HIGH (ref 1.4–6.5)
NEUTROPHILS NFR BLD AUTO: 84.1 % — HIGH (ref 42.2–75.2)
NRBC BLD AUTO-RTO: 0 /100 WBCS — SIGNIFICANT CHANGE UP (ref 0–0)
PLATELET # BLD AUTO: 329 K/UL — SIGNIFICANT CHANGE UP (ref 130–400)
PMV BLD: 9.7 FL — SIGNIFICANT CHANGE UP (ref 7.4–10.4)
POTASSIUM SERPL-MCNC: 4.6 MMOL/L — SIGNIFICANT CHANGE UP (ref 3.5–5)
POTASSIUM SERPL-SCNC: 4.6 MMOL/L — SIGNIFICANT CHANGE UP (ref 3.5–5)
PROT SERPL-MCNC: 7.4 G/DL — SIGNIFICANT CHANGE UP (ref 6–8)
PROTHROM AB SERPL-ACNC: 10.3 SEC — SIGNIFICANT CHANGE UP (ref 9.95–12.87)
RBC # BLD: 5.31 M/UL — SIGNIFICANT CHANGE UP (ref 4.7–6.1)
RBC # FLD: 12.7 % — SIGNIFICANT CHANGE UP (ref 11.5–14.5)
SODIUM SERPL-SCNC: 137 MMOL/L — SIGNIFICANT CHANGE UP (ref 135–146)
VALPROATE SERPL-MCNC: <3 UG/ML — LOW (ref 50–100)
WBC # BLD: 14.89 K/UL — HIGH (ref 4.8–10.8)
WBC # FLD AUTO: 14.89 K/UL — HIGH (ref 4.8–10.8)

## 2025-07-03 PROCEDURE — 85025 COMPLETE CBC W/AUTO DIFF WBC: CPT

## 2025-07-03 PROCEDURE — 99408 AUDIT/DAST 15-30 MIN: CPT

## 2025-07-03 PROCEDURE — 96374 THER/PROPH/DIAG INJ IV PUSH: CPT | Mod: XU

## 2025-07-03 PROCEDURE — 12011 RPR F/E/E/N/L/M 2.5 CM/<: CPT

## 2025-07-03 PROCEDURE — 82962 GLUCOSE BLOOD TEST: CPT

## 2025-07-03 PROCEDURE — 70450 CT HEAD/BRAIN W/O DYE: CPT

## 2025-07-03 PROCEDURE — 80164 ASSAY DIPROPYLACETIC ACD TOT: CPT

## 2025-07-03 PROCEDURE — 36415 COLL VENOUS BLD VENIPUNCTURE: CPT

## 2025-07-03 PROCEDURE — 93005 ELECTROCARDIOGRAM TRACING: CPT

## 2025-07-03 PROCEDURE — 85730 THROMBOPLASTIN TIME PARTIAL: CPT

## 2025-07-03 PROCEDURE — 99285 EMERGENCY DEPT VISIT HI MDM: CPT | Mod: 25

## 2025-07-03 PROCEDURE — 85610 PROTHROMBIN TIME: CPT

## 2025-07-03 PROCEDURE — 80177 DRUG SCRN QUAN LEVETIRACETAM: CPT

## 2025-07-03 PROCEDURE — 70450 CT HEAD/BRAIN W/O DYE: CPT | Mod: 26

## 2025-07-03 PROCEDURE — 93010 ELECTROCARDIOGRAM REPORT: CPT

## 2025-07-03 PROCEDURE — 80053 COMPREHEN METABOLIC PANEL: CPT

## 2025-07-03 RX ORDER — DIAZEPAM 5 MG/1
5 TABLET ORAL ONCE
Refills: 0 | Status: DISCONTINUED | OUTPATIENT
Start: 2025-07-03 | End: 2025-07-03

## 2025-07-03 RX ADMIN — DIAZEPAM 5 MILLIGRAM(S): 5 TABLET ORAL at 13:36

## 2025-07-03 RX ADMIN — Medication 101 MILLIGRAM(S): at 13:32

## 2025-07-03 RX ADMIN — Medication 1000 MILLILITER(S): at 13:32

## 2025-07-03 NOTE — ED PROVIDER NOTE - NSFOLLOWUPINSTRUCTIONS_ED_ALL_ED_FT
Seizure    A seizure is abnormal electrical activity in the brain; the specific cause may or may not be found. Prior to a seizure you may experience a warning sensation (aura) that may include fear, nausea, dizziness, and visual changes such as flashing lights of spots. Common symptoms during the seizure may include an altered mental status, rhythmic jerking movements, drooling, grunting, loss of bladder or bowel control, or tongue biting. After a seizure, you may feel confused and sleepy.     Do not swim, drive, operate machinery, or engage in any risky activity during which a seizure could cause further injury to you or others. Teach friends and family what to do if you HAVE a seizure which includes laying you on the ground with your head on a cushion and turning you to the side to keep your breathing passages clear in case of vomiting.    SEEK IMMEDIATE MEDICAL CARE IF YOU HAVE ANY OF THE FOLLOWING SYMPTOMS: seizure lasting over 5 minutes, not waking up or persistent altered mental status after the seizure, or more frequent or worsening seizures.          remove sutures in 10 days. Suture #: 5  Come back for removal in: 5 days    SUTURE CARE    While at home:  Keep the wound clean and dry.  If you were given a bandage, you may change it daily as follows:  After removing the bandage, wash the area with soap and water.  After cleaning, reapply a fresh bandage.  You may remove the bandage to shower as usual after the first 24 hours, but do not soak the area in water (no tub baths or swimming) until the sutures are removed.     Follow Up:  If sutures or staples are in place, it is important to keep your appointment for removal. If they are left in place too long permanent marks may remain.    If Steri-Strips were applied, they will usually fall off by themselves after 10-12 days.     Call your doctor right away if you notice:  Increased drainage or bleeding from the wound  Redness in or around the wound  Foul odor or pus coming from the wound  Fever above 101.0°F or shaking chills     Seizure    A seizure is abnormal electrical activity in the brain; the specific cause may or may not be found. Prior to a seizure you may experience a warning sensation (aura) that may include fear, nausea, dizziness, and visual changes such as flashing lights of spots. Common symptoms during the seizure may include an altered mental status, rhythmic jerking movements, drooling, grunting, loss of bladder or bowel control, or tongue biting. After a seizure, you may feel confused and sleepy.     Do not swim, drive, operate machinery, or engage in any risky activity during which a seizure could cause further injury to you or others. Teach friends and family what to do if you HAVE a seizure which includes laying you on the ground with your head on a cushion and turning you to the side to keep your breathing passages clear in case of vomiting.    SEEK IMMEDIATE MEDICAL CARE IF YOU HAVE ANY OF THE FOLLOWING SYMPTOMS: seizure lasting over 5 minutes, not waking up or persistent altered mental status after the seizure, or more frequent or worsening seizures.          remove sutures in 10 days.

## 2025-07-03 NOTE — ED PROVIDER NOTE - OBJECTIVE STATEMENT
22-year-old male with PMH of seizures on Keppra, noncompliant, EtOH abuse presents for seizure.  Onset today around 1130.  Patient was in the bathroom using TikTok.  His 6-year-old son heard a thump, called mom who was at work and said that dad had a seizure.  That somehow got back into bed.  He does not remember this episode, positive LOC, positive head trauma on an unknown object.  Patient has slight lightheadedness, no headache or blurry or double vision.  Small controlled bleed left upper eyelid.  Admits to alcohol use, 2-3 cups of tequila every other day.  Last intake of alcohol was this morning at 4 AM, couple randalltzers.  Denies auditory, visual, tactile hallucinations, not anxious, not sweating.  Denies chest pain, shortness of breath, numbness or tingling, abdominal pain.  Would like to cut down on his drinking.  Wife at bedside providing collateral, admits that he does drink heavily and is noncompliant with neurology orders.

## 2025-07-03 NOTE — ED ADULT NURSE NOTE - NSFALLUNIVINTERV_ED_ALL_ED
Bed/Stretcher in lowest position, wheels locked, appropriate side rails in place/Call bell, personal items and telephone in reach/Instruct patient to call for assistance before getting out of bed/chair/stretcher/Non-slip footwear applied when patient is off stretcher/[place] to call system/Physically safe environment - no spills, clutter or unnecessary equipment/Purposeful proactive rounding/Room/bathroom lighting operational, light cord in reach

## 2025-07-03 NOTE — ED PROVIDER NOTE - PROGRESS NOTE DETAILS
ySlvia Chatman, DO: Incorrect exam ordered, patient is on Keppra and not on Depakote.  Keppra level sent, patient will be notified of results after discharge.  Strict turn precautions given, S SBIRT counseling offered and given to the patient, resources administered.  Patient aware of risks of seizure disorder with alcohol use and importance of medication compliance.

## 2025-07-03 NOTE — ED PROVIDER NOTE - NSFOLLOWUPCLINICS_GEN_ALL_ED_FT
A Family Medicine Doctor  Family Medicine  .  [place]   Phone:   Fax:     A Neurologist  Neurology  .  [place]   Phone:   Fax:

## 2025-07-03 NOTE — ED ADULT NURSE NOTE - OBJECTIVE STATEMENT
Pt presents to the ED s/o witnessed seizure. Pt reports missing a dose of Keppra last night. Last seizure was in March. Pt also reports drinking alcohol heavily the last 4 days. Reports his last drink was at 4am.

## 2025-07-03 NOTE — ED ADULT TRIAGE NOTE - CHIEF COMPLAINT QUOTE
Pt had witnessed seizure today, skipped dose of Keppra two nights ago and has been drinking a lot of alcohol the last 4 days... When pt seized today, fell and hit face, lac noted to left eyebrow and pain from hitting finger on floor.

## 2025-07-03 NOTE — ED PROVIDER NOTE - PATIENT PORTAL LINK FT
You can access the FollowMyHealth Patient Portal offered by [facility] by registering at the following website: [email or web address]. By joining [employer]s FollowMyHealth portal, you will also be able to view your health information using other applications (apps) compatible with our system.

## 2025-07-03 NOTE — ED PROVIDER NOTE - CLINICAL SUMMARY MEDICAL DECISION MAKING FREE TEXT BOX
Male history of alcohol use disorder, seizure disorder on Keppra noncompliant presenting with seizure.  Patient had tonic-clonic seizure hit his face on the ground.  Patient has a 3 cm lesion above the left eyebrow repaired with sutures.  No orbital wall tenderness.  Full range of motion of the eyes.  Tetanus up-to-date.  Patient counseled alcohol cessation.  Consulted aspirate which gave patient information about alcohol cessation.  Patient declined medications.  Patient has elevated white blood cell count.  Appears to be chronic or likely in the setting of the marginalization from seizure.  Patient's Keppra levels are pending.  Does not take Depakote.  Patient ambulating with steady gait.

## 2025-07-03 NOTE — ED PROVIDER NOTE - PHYSICAL EXAMINATION
General: NAD  Head: NC. <1cm superficial lac to upper L eyebrow.  Bleeding controlled, hematoma mild.   Eyes: EOMI  Heart:  Lungs:  Extremities: No decrease in AROM.    II: Visual fields intact.   III, IV, VI: EOMI, PERRL, no nystagmus.   V:  Facial sensation V1-V3 equal and intact   VII: Face is symmetric.  IX, X: Uvula is midline and soft palate rises symmetrically  XI: Head turning and shoulder shrug are intact.  XII: Tongue protrudes midline    Speech: No dysarthria noted.   Motor: Normal bulk and tone. No pronator drift. Strength bilateral upper extremity 5/5, bilateral lower extremities 5/5.  Sensation: SILT  Gait: Normal, able to heel to heel and toe to toe.

## 2025-07-03 NOTE — SBIRT NOTE ADULT - NSSBIRTBRIEFINTDET_GEN_A_CORE
Screening results were reviewed with the patient. Patient was provided educational materials on low-risk guidelines and substance use and health. Motivation and goals were discussed. Screening results were not discussed with the patient because patient declined. Patient provided with [place] SBIRT information. Patient provided with resources to support their substance use goals.

## 2025-07-03 NOTE — ED PROVIDER NOTE - CARE PLAN
1 Principal Discharge DX:	Seizure  Secondary Diagnosis:	Minor head trauma  Secondary Diagnosis:	Alcohol dependence

## 2025-07-08 LAB — LEVETIRACETAM SERPL-MCNC: 12 UG/ML — SIGNIFICANT CHANGE UP (ref 10–40)
